# Patient Record
Sex: FEMALE | Race: WHITE | NOT HISPANIC OR LATINO | Employment: FULL TIME | URBAN - METROPOLITAN AREA
[De-identification: names, ages, dates, MRNs, and addresses within clinical notes are randomized per-mention and may not be internally consistent; named-entity substitution may affect disease eponyms.]

---

## 2017-07-03 ENCOUNTER — DOCUMENTATION (OUTPATIENT)
Dept: BARIATRICS/WEIGHT MGMT | Facility: CLINIC | Age: 57
End: 2017-07-03

## 2017-07-03 DIAGNOSIS — R10.13 DYSPEPSIA: ICD-10-CM

## 2017-07-03 DIAGNOSIS — R06.00 DYSPNEA, UNSPECIFIED TYPE: ICD-10-CM

## 2017-07-03 DIAGNOSIS — R53.83 FATIGUE, UNSPECIFIED TYPE: ICD-10-CM

## 2017-07-03 DIAGNOSIS — R73.03 PREDIABETES: Primary | ICD-10-CM

## 2017-07-03 RX ORDER — BUPROPION HYDROCHLORIDE 150 MG/1
150 TABLET ORAL DAILY
COMMUNITY
End: 2017-12-08

## 2017-07-03 RX ORDER — HYDROCHLOROTHIAZIDE 25 MG/1
25 TABLET ORAL DAILY
COMMUNITY
End: 2017-12-13 | Stop reason: HOSPADM

## 2017-07-03 RX ORDER — CHOLECALCIFEROL (VITAMIN D3) 125 MCG
5 CAPSULE ORAL NIGHTLY
COMMUNITY
End: 2017-12-08

## 2017-07-03 RX ORDER — ANASTROZOLE 1 MG/1
TABLET ORAL NIGHTLY
COMMUNITY

## 2017-07-03 RX ORDER — MULTIVIT WITH MINERALS/LUTEIN
1000 TABLET ORAL DAILY
COMMUNITY
End: 2017-12-08

## 2017-07-03 RX ORDER — BIOTIN 10000 MCG
10 CAPSULE ORAL DAILY
COMMUNITY

## 2017-07-31 DIAGNOSIS — R73.03 PREDIABETES: ICD-10-CM

## 2017-07-31 DIAGNOSIS — R10.13 DYSPEPSIA: ICD-10-CM

## 2017-07-31 DIAGNOSIS — R06.00 DYSPNEA, UNSPECIFIED TYPE: ICD-10-CM

## 2017-07-31 DIAGNOSIS — R53.83 FATIGUE, UNSPECIFIED TYPE: ICD-10-CM

## 2017-08-03 ENCOUNTER — OFFICE VISIT (OUTPATIENT)
Dept: BARIATRICS/WEIGHT MGMT | Facility: CLINIC | Age: 57
End: 2017-08-03

## 2017-08-03 ENCOUNTER — DOCUMENTATION (OUTPATIENT)
Dept: BARIATRICS/WEIGHT MGMT | Facility: CLINIC | Age: 57
End: 2017-08-03

## 2017-08-03 VITALS
OXYGEN SATURATION: 98 % | HEIGHT: 64 IN | DIASTOLIC BLOOD PRESSURE: 82 MMHG | HEART RATE: 59 BPM | RESPIRATION RATE: 20 BRPM | BODY MASS INDEX: 37.73 KG/M2 | SYSTOLIC BLOOD PRESSURE: 142 MMHG | TEMPERATURE: 98 F | WEIGHT: 221 LBS

## 2017-08-03 DIAGNOSIS — I10 ESSENTIAL HYPERTENSION: Primary | ICD-10-CM

## 2017-08-03 DIAGNOSIS — R12 HEARTBURN: ICD-10-CM

## 2017-08-03 DIAGNOSIS — R53.83 FATIGUE, UNSPECIFIED TYPE: ICD-10-CM

## 2017-08-03 DIAGNOSIS — E66.9 OBESITY, CLASS II, BMI 35-39.9: ICD-10-CM

## 2017-08-03 PROCEDURE — 99205 OFFICE O/P NEW HI 60 MIN: CPT | Performed by: PHYSICIAN ASSISTANT

## 2017-08-03 RX ORDER — RANITIDINE 150 MG/1
150 TABLET ORAL AS NEEDED
COMMUNITY
End: 2017-12-13 | Stop reason: HOSPADM

## 2017-08-03 NOTE — PROGRESS NOTES
[facility] GROUP BARIATRIC SURGERY  [address]  [facility] [address]  [phone]      Patient  Name:  Sonya Peterson.  :  1960      Date of Visit: 8/3/2017      Chief Complaint:  weight gain; unable to maintain weight loss    History of Present Illness:  Sonya Peterson is a 57 y.o. female who presents today for evaluation, education and consultation regarding weight loss surgery. The patient is interested in sleeve gastrectomy.     Sonya has been overweight for at least 15 years, has been 35 pounds or more overweight for at least 8 years, has been 100 pounds or more overweight for n/a or more years and started dieting at age 22.      Previous diet attempts include: [employer] and [employer]; [employer] and Weight Watchers; Wellbutrin and Dexatrim.  The most weight Sonya lost was 25 pounds on weight watchers but was only able to maintain that weight loss for less than 6 months.  Her maximum lifetime weight is 221 pounds.      Past Medical History:   Diagnosis Date   • Breast cancer     dx 2015, stage 2, s/p lumpectomy, then chemo/XRT, finished 2016 - follows @ [facility], mammograms q 6months, last 2017 OK   • Depression    • Dyspepsia    • Fatigue    • Heartburn     BID Zantac   • HTN (hypertension)    • Obesity      Past Surgical History:   Procedure Laterality Date   • BREAST LUMPECTOMY Left     for stage 2 breast CA   •  SECTION     • COLONOSCOPY      unremarkable   • TONSILLECTOMY     • TOTAL ABDOMINAL HYSTERECTOMY      endometriosis   • TUBAL ABDOMINAL LIGATION     • TYMPANOSTOMY TUBE PLACEMENT         Allergies   Allergen Reactions   • Ace Inhibitors Swelling   • Sulfa Antibiotics      rigors/chills         Current Outpatient Prescriptions:   •  anastrozole (ARIMIDEX) 1 MG tablet, Take  by mouth Daily., Disp: , Rfl:   •  Biotin 10 MG capsule, Take 10 mg by mouth Daily., Disp: , Rfl:   •  buPROPion XL (WELLBUTRIN  XL) 150 MG 24 hr tablet, Take 150 mg by mouth Daily., Disp: , Rfl:   •  fluticasone (VERAMYST) 27.5 MCG/SPRAY nasal spray, 2 sprays into each nostril Daily., Disp: , Rfl:   •  hydrochlorothiazide (HYDRODIURIL) 25 MG tablet, Take 25 mg by mouth Daily., Disp: , Rfl:   •  melatonin 5 MG tablet tablet, Take 5 mg by mouth Every Night., Disp: , Rfl:   •  metoprolol tartrate (LOPRESSOR) 25 MG tablet, Take 25 mg by mouth 2 (Two) Times a Day., Disp: , Rfl:   •  Multiple Vitamins-Minerals (MULTIVITAMIN ADULT PO), Take  by mouth Daily., Disp: , Rfl:   •  raNITIdine (ZANTAC) 150 MG tablet, Take 150 mg by mouth 2 (Two) Times a Day., Disp: , Rfl:   •  vitamin E 1000 UNIT capsule, Take 1,000 Units by mouth Daily., Disp: , Rfl:     Social History     Social History   • Marital status:      Spouse name: Tavo Davis   • Number of children: N/A   • Years of education: Masters degree     Occupational History   • APRN [employer]     Social History Main Topics   • Smoking status: Never Smoker   • Smokeless tobacco: Never Used   • Alcohol use Yes      Comment: Less than 1 per mo. 1 drink each time for 30 yrs   • Drug use: No   • Sexual activity: Not on file     Other Topics Concern   • Not on file     Social History Narrative    Lives in [place], [place] w/ .   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[employer]     Family History   Problem Relation Age of Onset   • Obesity Mother    • Diabetes Father    • Hypertension Father    • Heart disease Father    • Hypertension Brother    • Hypertension Maternal Grandmother    • Stroke Maternal Grandmother    • Heart disease Maternal Grandmother    • COPD Maternal Grandmother    • Hypertension Maternal Grandfather    • Stroke Maternal Grandfather    • Heart disease Maternal Grandfather    • Obesity Paternal Grandmother    • Cerebral aneurysm Paternal Grandfather          Review of Systems:  Constitutional:  The patient reports fatigue, weight gain and denies fevers  and chills.  Cardiovascular:  The patient reports HTN and denies heart disease.  Respiratory:  The patient denies asthma, apnea and PE.  Gastrointestinal:  The patient reports heartburn and denies liver disease or gallbladder issues.  Genitourinary:  The patient denies renal insufficiency.    Musculoskeletal:  The patient denies fibromyalgia and autoimmune disease.  Neurological:  The patient denies seizure and stroke.  Psychiatric:  The patient reports depression and denies anxiety.  Endocrine:  The patient denies diabetes and thyroid disease.  Hematologic:  The patient denies anemia and bleeding disorder.  Skin:  The patient denies MRSA.    Physical Exam:  Vital Signs:  Weight: 221 lb (100 kg)   Body mass index is 37.93 kg/(m^2).  Temp: 98 °F (36.7 °C)   Heart Rate: 59   BP: 142/82     Physical Exam   Constitutional: She is oriented to person, place, and time. She appears well-developed and well-nourished.   HENT:   Head: Normocephalic and atraumatic.   Eyes: Conjunctivae are normal. No scleral icterus.   Neck: Neck supple. No thyromegaly present.   Cardiovascular: Normal rate and regular rhythm.    No murmur heard.  Pulmonary/Chest: Effort normal and breath sounds normal. No respiratory distress. She has no wheezes. She has no rales.   Abdominal: Soft. Bowel sounds are normal. She exhibits no distension and no mass. There is no tenderness. No hernia.   Musculoskeletal: Normal range of motion. She exhibits no edema.   Neurological: She is alert and oriented to person, place, and time. Gait normal.   Skin: Skin is warm and dry. No rash noted.   Psychiatric: She has a normal mood and affect. Judgment normal.   Vitals reviewed.         Patient Active Problem List   Diagnosis   • HTN (hypertension)   • Depression   • Obesity   • Fatigue   • Heartburn   • Dyspepsia   • Breast cancer       Assessment:    Sonya Peterson is a 57 y.o. year old female with medically complicated obesity pursuing sleeve  gastrectomy.    Weight loss surgery is deemed medically necessary given the following obesity related comorbidities including hypertension with current Weight: 221 lb (100 kg) and Body mass index is 37.93 kg/(m^2)..    Plan:  The consultation plan and program requirements were reviewed with the patient.  The patient has been advised that a letter of medical support must be obtained from her primary care physician or referring provider. A psychological evaluation will be arranged.  A nutritional evaluation will be performed.  The patient was advised to start a high protein and low carbohydrate diet.  Necessary lifestyle modifications were discussed.  Instructions on how to access ROSARIO was given to the patient.  ROSARIO is an internet based educational video that explains the surgical procedure chosen and answers basic questions regarding that procedure.     Preoperative testing will include: CBC, CMP, Fasting Lipids, TSH, HgA1C, H.Pylori, CXR, EKG and EGD     Additional preop clearances required prior to surgery: Cardiac.      Patient understands that bariatric surgery is not cosmetic surgery but rather a tool to help make a lifelong commitment to lifestyle changes including diet, exercise, behavior modifications, and healthy habits.      CATRINA Bruno

## 2017-08-03 NOTE — PROGRESS NOTES
"Weight Loss Surgery  Presurgical Nutrition Assessment     Sonya NGUYEN Ofzechariah  2017  [id number]  [id number]  1960  female    Surgery desired: Sleeve Gastrectomy    Ht 64\" (162.6 cm); Wt 221 # (100 kg); BMI 37.9   Past Medical History:   Diagnosis Date   • Breast cancer     dx 2015, stage 2, s/p lumpectomy, then chemo/XRT, finished 2016 - follows @ [facility], mammograms q 6months, last 2017 OK   • Depression    • Dyspepsia    • Fatigue    • Heartburn     BID Zantac   • HTN (hypertension)    • Obesity      Past Surgical History:   Procedure Laterality Date   • BREAST LUMPECTOMY Left     for stage 2 breast CA   •  SECTION     • COLONOSCOPY      unremarkable   • TONSILLECTOMY     • TOTAL ABDOMINAL HYSTERECTOMY      endometriosis   • TUBAL ABDOMINAL LIGATION     • TYMPANOSTOMY TUBE PLACEMENT       Allergies   Allergen Reactions   • Ace Inhibitors Swelling   • Sulfa Antibiotics      rigors/chills       Current Outpatient Prescriptions:   •  anastrozole (ARIMIDEX) 1 MG tablet, Take  by mouth Daily., Disp: , Rfl:   •  Biotin 10 MG capsule, Take 10 mg by mouth Daily., Disp: , Rfl:   •  buPROPion XL (WELLBUTRIN XL) 150 MG 24 hr tablet, Take 150 mg by mouth Daily., Disp: , Rfl:   •  fluticasone (VERAMYST) 27.5 MCG/SPRAY nasal spray, 2 sprays into each nostril Daily., Disp: , Rfl:   •  hydrochlorothiazide (HYDRODIURIL) 25 MG tablet, Take 25 mg by mouth Daily., Disp: , Rfl:   •  melatonin 5 MG tablet tablet, Take 5 mg by mouth Every Night., Disp: , Rfl:   •  metoprolol tartrate (LOPRESSOR) 25 MG tablet, Take 25 mg by mouth 2 (Two) Times a Day., Disp: , Rfl:   •  Multiple Vitamins-Minerals (MULTIVITAMIN ADULT PO), Take  by mouth Daily., Disp: , Rfl:   •  raNITIdine (ZANTAC) 150 MG tablet, Take 150 mg by mouth 2 (Two) Times a Day., Disp: , Rfl:   •  vitamin E 1000 UNIT capsule, Take 1,000 Units by mouth Daily., Disp: , Rfl:       Nutrition Assessment    Estimated energy needs:  1580 " "kcal    Estimated calories for weight loss:  1100 kcal    IBW (Pounds):  145 #        Excess body weight (Pounds):  76 #       Nutrition Recall  24 Hour recall: (B) (L) (D) -  Reviewed and discussed with patient.  Judy watson nutrition & meal planning.  Eats balanced meals but side dishes, condiments, and snacks are not consistent /c wt loss goals.  High carb choices often eg egg, bean & potato breakfast burrito, mashed potatoes, potato chips. States she drinks at most one-20 oz serving diet Pepsi qd.  Drinks lots of water and unsweetened iced tea.  Few vegetables (c/o of constipation & use of products eg fiber \"pills\")        Exercise  never      Education    Provided info packet re.   Sleeve Gastrectomy; .  Reviewed mechanics of healthy eating for current use ongoing /p recovery from surgery. Enc'd pt to choose a preferred & tolerated protein powder or pkg'd protein beverage for post-op diet, and for current use if needed to ingest adequate protein.  Discussed goal setting to address changes from unhealthy eating habits to healthier ones.     Recommend that team proceed with surgery and follow per protocol.      Nutrition Goals   Dietary Guidelines per information packet, as described above.  Protein goal:  grams per day   -140 grams per day   Eliminate soda    Exercise Goals  Continue current exercise routine   Add 15-30 minutes of activity per day as tolerated      Aide Leal RD  08/03/2017  3:30 PM  "

## 2017-08-28 LAB
H PYLORI IGA SER-ACNC: <9 UNITS (ref 0–8.9)
H PYLORI IGG SER IA-ACNC: <0.9 U/ML (ref 0–0.8)
H PYLORI IGM SER-ACNC: <9 UNITS (ref 0–8.9)

## 2017-10-26 DIAGNOSIS — R53.83 FATIGUE, UNSPECIFIED TYPE: ICD-10-CM

## 2017-10-26 DIAGNOSIS — R06.00 DYSPNEA, UNSPECIFIED TYPE: Primary | ICD-10-CM

## 2017-10-26 DIAGNOSIS — R06.00 DYSPNEA, UNSPECIFIED TYPE: ICD-10-CM

## 2017-11-10 DIAGNOSIS — R53.83 FATIGUE, UNSPECIFIED TYPE: ICD-10-CM

## 2017-11-10 DIAGNOSIS — R06.00 DYSPNEA, UNSPECIFIED TYPE: Primary | ICD-10-CM

## 2017-11-13 ENCOUNTER — CONSULT (OUTPATIENT)
Dept: BARIATRICS/WEIGHT MGMT | Facility: CLINIC | Age: 57
End: 2017-11-13

## 2017-11-13 VITALS
TEMPERATURE: 98 F | WEIGHT: 234.5 LBS | OXYGEN SATURATION: 99 % | DIASTOLIC BLOOD PRESSURE: 87 MMHG | SYSTOLIC BLOOD PRESSURE: 143 MMHG | HEART RATE: 66 BPM | HEIGHT: 64 IN | BODY MASS INDEX: 40.04 KG/M2 | RESPIRATION RATE: 18 BRPM

## 2017-11-13 DIAGNOSIS — E66.9 OBESITY, CLASS II, BMI 35-39.9: ICD-10-CM

## 2017-11-13 DIAGNOSIS — R10.13 DYSPEPSIA: ICD-10-CM

## 2017-11-13 DIAGNOSIS — R06.00 DYSPNEA, UNSPECIFIED TYPE: ICD-10-CM

## 2017-11-13 DIAGNOSIS — R53.83 FATIGUE, UNSPECIFIED TYPE: Primary | ICD-10-CM

## 2017-11-13 DIAGNOSIS — R12 HEARTBURN: ICD-10-CM

## 2017-11-13 DIAGNOSIS — R73.03 PREDIABETES: ICD-10-CM

## 2017-11-13 DIAGNOSIS — I10 ESSENTIAL HYPERTENSION: ICD-10-CM

## 2017-11-13 PROCEDURE — 99214 OFFICE O/P EST MOD 30 MIN: CPT | Performed by: SURGERY

## 2017-11-13 RX ORDER — SCOLOPAMINE TRANSDERMAL SYSTEM 1 MG/1
1 PATCH, EXTENDED RELEASE TRANSDERMAL
Status: CANCELLED | OUTPATIENT
Start: 2017-11-13

## 2017-11-13 RX ORDER — SODIUM CHLORIDE 9 MG/ML
150 INJECTION, SOLUTION INTRAVENOUS CONTINUOUS
Status: CANCELLED | OUTPATIENT
Start: 2017-11-13

## 2017-11-13 RX ORDER — PANTOPRAZOLE SODIUM 40 MG/10ML
40 INJECTION, POWDER, LYOPHILIZED, FOR SOLUTION INTRAVENOUS ONCE
Status: CANCELLED | OUTPATIENT
Start: 2017-11-13 | End: 2017-11-13

## 2017-11-13 NOTE — PROGRESS NOTES
"[facility] BARIATRIC SURGERY  [address]  [facility] [address] [phone]      Patient  Name:  Sonya Peterson  :  1960      Date of Visit: 2017      Chief Complaint:  weight gain; unable to maintain weight loss    History of Present Illness:  Sonya Peterson is a 57 y.o. female who presents today for evaluation, education and consultation regarding weight loss surgery.     Patient has been overweight for many years, with numerous failed dietary/weight loss attempts.  She now has obesity related comorbidities of hypertension, depression, fatigue, heartburn and as such has decided to pursue weight loss surgery.    There has been no change in the medical history.  She takes Arimidex as hormonal therapy for breast cancer, which is in remission.  Her oncologist has provided clearance, but no discussion has been made about whether or not to hold Arimidex before and after surgery.      Review of data:    EGD: 17 @ , +HH, \"chemical gastritis\", no esophageal bx  CBC: ok  CMP: ok  HP serum negative  Oncology clearance re: off Arimidex: \"In clinical remission.\"  No mention of Arimidex.     Cardiac clearance: moderate and acceptable risk  Echo: nl LV, no valve lesions  Stress test: normal  Pulm clearance: cleared  EKG: NSR  CXR: ok  RICK: Negative        Last tobacco: n/a  Last NSAIDs: 2 weeks ago  Last ASA: n/a  Last steroids: n/a  Last hormones: Arimidex (aromatose inhibitor)      Past Medical History:   Diagnosis Date   • Breast cancer     dx 2015, stage 2, s/p lumpectomy, then chemo/XRT, finished 2016 - follows @ [facility], mammograms q 6months, last 2017 OK   • Depression    • Dyspepsia    • Fatigue    • Heartburn     BID Zantac   • HTN (hypertension)    • Obesity      Past Surgical History:   Procedure Laterality Date   • BREAST LUMPECTOMY Left     for stage 2 breast CA   •  SECTION     • COLONOSCOPY      unremarkable   • TONSILLECTOMY  " 1974   • TOTAL ABDOMINAL HYSTERECTOMY  2004    endometriosis   • TUBAL ABDOMINAL LIGATION  1999   • TYMPANOSTOMY TUBE PLACEMENT  1974       Allergies   Allergen Reactions   • Ace Inhibitors Swelling   • Sulfa Antibiotics      rigors/chills       Current Outpatient Prescriptions:   •  anastrozole (ARIMIDEX) 1 MG tablet, Take  by mouth Daily., Disp: , Rfl:   •  Biotin 10 MG capsule, Take 10 mg by mouth Daily., Disp: , Rfl:   •  buPROPion XL (WELLBUTRIN XL) 150 MG 24 hr tablet, Take 150 mg by mouth Daily., Disp: , Rfl:   •  hydrochlorothiazide (HYDRODIURIL) 25 MG tablet, Take 25 mg by mouth Daily., Disp: , Rfl:   •  melatonin 5 MG tablet tablet, Take 5 mg by mouth Every Night., Disp: , Rfl:   •  metoprolol tartrate (LOPRESSOR) 25 MG tablet, Take 25 mg by mouth 2 (Two) Times a Day., Disp: , Rfl:   •  Multiple Vitamins-Minerals (MULTIVITAMIN ADULT PO), Take  by mouth Daily., Disp: , Rfl:   •  raNITIdine (ZANTAC) 150 MG tablet, Take 150 mg by mouth Every Night., Disp: , Rfl:   •  vitamin E 1000 UNIT capsule, Take 1,000 Units by mouth Daily., Disp: , Rfl:     Social History     Social History   • Marital status:      Spouse name: Tavo Davis   • Number of children: N/A   • Years of education: Masters degree     Occupational History   • [employer]     Social History Main Topics   • Smoking status: Never Smoker   • Smokeless tobacco: Never Used   • Alcohol use Yes      Comment: Less than 1 per mo. 1 drink each time for 30 yrs   • Drug use: No   • Sexual activity: Not on file     Other Topics Concern   • Not on file     Social History Narrative    Lives in [place], [place] w/ .   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[employer]     Family History   Problem Relation Age of Onset   • Obesity Mother    • Diabetes Father    • Hypertension Father    • Heart disease Father    • Hypertension Brother    • Hypertension Maternal Grandmother    • Stroke Maternal Grandmother    • Heart disease  Maternal Grandmother    • COPD Maternal Grandmother    • Hypertension Maternal Grandfather    • Stroke Maternal Grandfather    • Heart disease Maternal Grandfather    • Obesity Paternal Grandmother    • Cerebral aneurysm Paternal Grandfather        Review of Systems:  Constitutional:  The patient reports fatigue, weight gain and denies fevers and chills.  Cardiovascular:  The patient reports HTN and denies heart disease.  Respiratory:  The patient reports none and denies asthma, apnea and PE.  Gastrointestinal:  The patient reports heartburn and denies pancreatitis, liver disease and IBS.  Genitourinary:  The patient denies renal insufficiency.    Musculoskeletal:  The patient reports none and denies fibromyalgia and autoimmune disease.  Neurological:  The patient reports none and denies seizure and stroke.  Psychiatric:  The patient reports depression and denies anxiety and bipolar disorder.  Endocrine:  The patient reports none and denies diabetes, thyroid disease and gout.  Hematologic:  The patient reports none and denies anemia and bleeding disorder.  Skin:  The patient denies MRSA.    Physical Exam:  Vital Signs:  Weight: 234 lb 8 oz (106 kg)   Body mass index is 40.25 kg/(m^2).  Temp: 98 °F (36.7 °C)   Heart Rate: 66   BP: 143/87     Physical Exam   Constitutional: She is oriented to person, place, and time. She appears well-developed and well-nourished. No distress.   HENT:   Head: Normocephalic and atraumatic.   Mouth/Throat: No oropharyngeal exudate.   Eyes: Conjunctivae and EOM are normal. Pupils are equal, round, and reactive to light. No scleral icterus.   Neck: Normal range of motion. Neck supple. No thyromegaly present.   Cardiovascular: Normal rate, regular rhythm and normal heart sounds.    Pulmonary/Chest: Effort normal and breath sounds normal. No respiratory distress.   Abdominal: Soft. She exhibits no distension. There is no tenderness.       Musculoskeletal: Normal range of motion. She exhibits  no edema or deformity.   Neurological: She is alert and oriented to person, place, and time. No cranial nerve deficit.   Skin: Skin is warm and dry. No rash noted. No erythema.   Psychiatric: She has a normal mood and affect. Her behavior is normal. Judgment and thought content normal.       Patient Active Problem List   Diagnosis   • HTN (hypertension)   • Depression   • Obesity   • Fatigue   • Heartburn   • Dyspepsia   • Breast cancer       Assessment:    Sonya Peterson is a 57 y.o. year old female with medically complicated obesity.    Weight loss surgery is deemed medically necessary given the following obesity related comorbidities including hypertension, depression, fatigue, heartburn with current Weight: 234 lb 8 oz (106 kg) and Body mass index is 40.25 kg/(m^2)..    Patient is aware that surgery is a tool, and that weight loss is not guaranteed but only seen in the context of appropriate use, follow up and exercise.    The patient was present for an approximately a 2.5 hour discussion of the purpose of weight loss surgery, how WLS is a tool to assist in achieving weight loss goals, the most common complications and how best to avoid them, and the strategies for short and long term weight loss.  Ample opportunity to discuss questions was available both in group and during the time of individual examination.    I reviewed all available preop labs, Xrays, tests, clearances, etc and signed off on these in the record.  All of this in addition to the patient's unique history and exam has been taken into consideration in determining their appropriate candidacy for weight loss surgery.    Complications  of laparoscopic/possible robotic gastric sleeve were discussed. The patient is well aware of the potential complications of surgery that include but not limited to bleeding, infections, deep venous thrombosis, pulmonary embolism, pulmonary complications such as pneumonia, cardiac events, hernias, small bowel  "obstruction, damage to the spleen or other organs, bowel injury, disfiguring scars, failure to lose weight, need for additional surgery, conversion to an open procedure, and death. Patient is also aware of complications which apply in this particular procedure that can include but are not limited to a \"leak\" at the staple line which in some instances may require conversion to gastric bypass.    The patient is aware if a hiatal hernia is encountered, it likely will be repaired.  R/B/A Rx to hiatal hernia repair were discussed as outlined in our long consent form.  Briefly risks in addition to those for LSG include recurrent hernia, WINDY, dysphagia, esophageal injury, pneumothorax, injury to the vagus nerves, injury to the thoracic duct, aorta or vena cava.    Greater than 3 minutes was spent with the patient discussing avoiding all tobacco products and second hand smoke at least 2 weeks pre-operatively and 6 weeks post-operatively to minimize the risk of sleeve leak.  This included discussing the importance of avoiding even secondhand smoke as the risk of leak is increased.  Examples discussed:  I made it very clear that the patient understands they should avoid even riding in a car where someone has previously smoked in the last 2 weeks, living in a house where someone smokes (even if it's in a separate room/patio/attached garage, etc.) we discussed that they should not have a conversation with a group of people who are smoking even if it's outside.  They can be around wood burning fires and barbecue.  I told them I do not know if marijuana has a same effects but my overall recommendation is to avoid it for 2 weeks prior in 6 weeks after surgery.  They also are aware that nicotine may also increase the risk of leak and I strongly encouraged him to avoid that as well for 2 weeks prior in 6 weeks after surgery.    Discussed the risks, benefits and alternative therapies at great length as outlined in our extensive " consent forms, consent videos, and educational teaching process under the direction of the center's .    A copy of the patient's signed informed consent is on file.    Plan:  Laparoscopic sleeve gastrectomy and hiatal hernia repair.  I have called her oncologist (Dr. Christine Hartmann [phone]) to discuss the risk:benefit ratio. R/b/a of holding vs. Continuing Arimidex explained to patient, and possible increased risk of VTE.  The patient will continue Arimidex, and will rx 3 weeks of Eliquis to cover post-operative VTE risk on aromatase inhibitor.        Alexandra Elmore MD

## 2017-11-14 PROBLEM — E66.9 OBESITY, CLASS II, BMI 35-39.9: Status: ACTIVE | Noted: 2017-11-14

## 2017-12-08 ENCOUNTER — APPOINTMENT (OUTPATIENT)
Dept: PREADMISSION TESTING | Facility: HOSPITAL | Age: 57
End: 2017-12-08

## 2017-12-08 LAB
DEPRECATED RDW RBC AUTO: 42.7 FL (ref 37–54)
ERYTHROCYTE [DISTWIDTH] IN BLOOD BY AUTOMATED COUNT: 12.7 % (ref 11.3–14.5)
HBA1C MFR BLD: 5.8 % (ref 4.8–5.6)
HCT VFR BLD AUTO: 44.1 % (ref 34.5–44)
HGB BLD-MCNC: 15.3 G/DL (ref 11.5–15.5)
MCH RBC QN AUTO: 31.7 PG (ref 27–31)
MCHC RBC AUTO-ENTMCNC: 34.7 G/DL (ref 32–36)
MCV RBC AUTO: 91.3 FL (ref 80–99)
PLATELET # BLD AUTO: 323 10*3/MM3 (ref 150–450)
PMV BLD AUTO: 9.5 FL (ref 6–12)
POTASSIUM BLD-SCNC: 3.4 MMOL/L (ref 3.5–5.5)
RBC # BLD AUTO: 4.83 10*6/MM3 (ref 3.89–5.14)
WBC NRBC COR # BLD: 6.41 10*3/MM3 (ref 3.5–10.8)

## 2017-12-08 PROCEDURE — 85027 COMPLETE CBC AUTOMATED: CPT | Performed by: ANESTHESIOLOGY

## 2017-12-08 PROCEDURE — 83036 HEMOGLOBIN GLYCOSYLATED A1C: CPT | Performed by: ANESTHESIOLOGY

## 2017-12-08 PROCEDURE — 84132 ASSAY OF SERUM POTASSIUM: CPT | Performed by: ANESTHESIOLOGY

## 2017-12-08 PROCEDURE — 36415 COLL VENOUS BLD VENIPUNCTURE: CPT

## 2017-12-08 RX ORDER — VITAMIN E 268 MG
400 CAPSULE ORAL DAILY
COMMUNITY

## 2017-12-08 RX ORDER — BUPROPION HYDROCHLORIDE 150 MG/1
150 TABLET, EXTENDED RELEASE ORAL 2 TIMES DAILY
COMMUNITY

## 2017-12-08 RX ORDER — CETIRIZINE HYDROCHLORIDE 10 MG/1
10 TABLET ORAL DAILY
COMMUNITY

## 2017-12-08 RX ORDER — PHENOL 1.4 %
1 AEROSOL, SPRAY (ML) MUCOUS MEMBRANE
COMMUNITY

## 2017-12-12 ENCOUNTER — HOSPITAL ENCOUNTER (INPATIENT)
Facility: HOSPITAL | Age: 57
LOS: 1 days | Discharge: HOME | End: 2017-12-13
Attending: SURGERY | Admitting: SURGERY

## 2017-12-12 ENCOUNTER — ANESTHESIA EVENT (OUTPATIENT)
Dept: PERIOP | Facility: HOSPITAL | Age: 57
End: 2017-12-12

## 2017-12-12 ENCOUNTER — ANESTHESIA (OUTPATIENT)
Dept: PERIOP | Facility: HOSPITAL | Age: 57
End: 2017-12-12

## 2017-12-12 DIAGNOSIS — E66.9 OBESITY, CLASS II, BMI 35-39.9: ICD-10-CM

## 2017-12-12 LAB — POTASSIUM BLDA-SCNC: 3.47 MMOL/L (ref 3.5–5.3)

## 2017-12-12 PROCEDURE — 43775 LAP SLEEVE GASTRECTOMY: CPT | Performed by: SURGERY

## 2017-12-12 PROCEDURE — 25010000002 PROPOFOL 10 MG/ML EMULSION: Performed by: NURSE ANESTHETIST, CERTIFIED REGISTERED

## 2017-12-12 PROCEDURE — 25010000003 CEFAZOLIN IN DEXTROSE 2-4 GM/100ML-% SOLUTION: Performed by: SURGERY

## 2017-12-12 PROCEDURE — 0BQT4ZZ REPAIR DIAPHRAGM, PERCUTANEOUS ENDOSCOPIC APPROACH: ICD-10-PCS | Performed by: SURGERY

## 2017-12-12 PROCEDURE — 25010000002 DEXAMETHASONE PER 1 MG: Performed by: NURSE ANESTHETIST, CERTIFIED REGISTERED

## 2017-12-12 PROCEDURE — 0DB64Z3 EXCISION OF STOMACH, PERCUTANEOUS ENDOSCOPIC APPROACH, VERTICAL: ICD-10-PCS | Performed by: SURGERY

## 2017-12-12 PROCEDURE — 94799 UNLISTED PULMONARY SVC/PX: CPT

## 2017-12-12 PROCEDURE — 25010000002 ENOXAPARIN PER 10 MG: Performed by: SURGERY

## 2017-12-12 PROCEDURE — 25010000002 HYDROMORPHONE PER 4 MG: Performed by: SURGERY

## 2017-12-12 PROCEDURE — 84132 ASSAY OF SERUM POTASSIUM: CPT | Performed by: ANESTHESIOLOGY

## 2017-12-12 PROCEDURE — 25010000002 DEXAMETHASONE SODIUM PHOSPHATE 10 MG/ML SOLUTION 1 ML VIAL: Performed by: NURSE ANESTHETIST, CERTIFIED REGISTERED

## 2017-12-12 PROCEDURE — 25010000002 BUPRENORPHINE PER 0.1 MG: Performed by: NURSE ANESTHETIST, CERTIFIED REGISTERED

## 2017-12-12 PROCEDURE — 25010000003 CEFAZOLIN PER 500 MG: Performed by: SURGERY

## 2017-12-12 PROCEDURE — 88307 TISSUE EXAM BY PATHOLOGIST: CPT | Performed by: SURGERY

## 2017-12-12 PROCEDURE — 25010000002 HYDRALAZINE PER 20 MG: Performed by: ANESTHESIOLOGY

## 2017-12-12 PROCEDURE — 0DJ08ZZ INSPECTION OF UPPER INTESTINAL TRACT, VIA NATURAL OR ARTIFICIAL OPENING ENDOSCOPIC: ICD-10-PCS | Performed by: SURGERY

## 2017-12-12 DEVICE — PERI-STRIPS DRY WITH VERITAS COLLAGEN MATRIX (PSD-V) IS PREPARED FROM DEHYDRATED BOVINE PERICARDIUM PROCURED FROM CATTLE UNDER 30 MONTHS OF AGE IN THE UNITED STATES. ONE (1) TUBE OF PSD GEL (GEL) IS PROVIDED FOR EVERY TWO (2) POUCHES OF PSD-V. THE GEL IS USED TO CREATE A TEMPORARY BOND BETWEEN THE PSD-V BUTTRESS AND THE SURGICAL STAPLER JAWS UNTIL THE STAPLER IS POSITIONED AND FIRED.
Type: IMPLANTABLE DEVICE | Site: STOMACH | Status: FUNCTIONAL
Brand: PERI-STRIPS DRY WITH VERITAS COLLAGEN MATRIX

## 2017-12-12 DEVICE — SEALANT FIBRIN TISSEEL FZ 4ML: Type: IMPLANTABLE DEVICE | Site: STOMACH | Status: FUNCTIONAL

## 2017-12-12 RX ORDER — LORAZEPAM 2 MG/ML
0.5 INJECTION INTRAMUSCULAR EVERY 12 HOURS PRN
Status: DISCONTINUED | OUTPATIENT
Start: 2017-12-12 | End: 2017-12-13 | Stop reason: HOSPADM

## 2017-12-12 RX ORDER — SODIUM CHLORIDE 9 MG/ML
150 INJECTION, SOLUTION INTRAVENOUS CONTINUOUS
Status: DISCONTINUED | OUTPATIENT
Start: 2017-12-12 | End: 2017-12-12 | Stop reason: HOSPADM

## 2017-12-12 RX ORDER — DIPHENHYDRAMINE HYDROCHLORIDE 50 MG/ML
25 INJECTION INTRAMUSCULAR; INTRAVENOUS EVERY 4 HOURS PRN
Status: DISCONTINUED | OUTPATIENT
Start: 2017-12-12 | End: 2017-12-13 | Stop reason: HOSPADM

## 2017-12-12 RX ORDER — ANASTROZOLE 1 MG/1
1 TABLET ORAL NIGHTLY
Status: DISCONTINUED | OUTPATIENT
Start: 2017-12-12 | End: 2017-12-13 | Stop reason: HOSPADM

## 2017-12-12 RX ORDER — HYDROMORPHONE HYDROCHLORIDE 2 MG/1
2 TABLET ORAL EVERY 4 HOURS PRN
Status: DISCONTINUED | OUTPATIENT
Start: 2017-12-12 | End: 2017-12-13 | Stop reason: HOSPADM

## 2017-12-12 RX ORDER — SODIUM CHLORIDE AND POTASSIUM CHLORIDE 150; 450 MG/100ML; MG/100ML
125 INJECTION, SOLUTION INTRAVENOUS CONTINUOUS
Status: DISCONTINUED | OUTPATIENT
Start: 2017-12-13 | End: 2017-12-13 | Stop reason: HOSPADM

## 2017-12-12 RX ORDER — SIMETHICONE 80 MG
80 TABLET,CHEWABLE ORAL 4 TIMES DAILY PRN
Status: DISCONTINUED | OUTPATIENT
Start: 2017-12-12 | End: 2017-12-13 | Stop reason: HOSPADM

## 2017-12-12 RX ORDER — PROPOFOL 10 MG/ML
VIAL (ML) INTRAVENOUS CONTINUOUS PRN
Status: DISCONTINUED | OUTPATIENT
Start: 2017-12-12 | End: 2017-12-12 | Stop reason: SURG

## 2017-12-12 RX ORDER — DEXAMETHASONE SODIUM PHOSPHATE 4 MG/ML
INJECTION, SOLUTION INTRA-ARTICULAR; INTRALESIONAL; INTRAMUSCULAR; INTRAVENOUS; SOFT TISSUE AS NEEDED
Status: DISCONTINUED | OUTPATIENT
Start: 2017-12-12 | End: 2017-12-12 | Stop reason: SURG

## 2017-12-12 RX ORDER — BUPROPION HYDROCHLORIDE 150 MG/1
150 TABLET, EXTENDED RELEASE ORAL EVERY 12 HOURS SCHEDULED
Status: DISCONTINUED | OUTPATIENT
Start: 2017-12-12 | End: 2017-12-13 | Stop reason: HOSPADM

## 2017-12-12 RX ORDER — PANTOPRAZOLE SODIUM 40 MG/10ML
40 INJECTION, POWDER, LYOPHILIZED, FOR SOLUTION INTRAVENOUS
Status: DISCONTINUED | OUTPATIENT
Start: 2017-12-13 | End: 2017-12-13 | Stop reason: HOSPADM

## 2017-12-12 RX ORDER — SODIUM CHLORIDE, SODIUM LACTATE, POTASSIUM CHLORIDE, CALCIUM CHLORIDE 600; 310; 30; 20 MG/100ML; MG/100ML; MG/100ML; MG/100ML
150 INJECTION, SOLUTION INTRAVENOUS CONTINUOUS
Status: DISCONTINUED | OUTPATIENT
Start: 2017-12-12 | End: 2017-12-13 | Stop reason: HOSPADM

## 2017-12-12 RX ORDER — HYDRALAZINE HYDROCHLORIDE 20 MG/ML
5 INJECTION INTRAMUSCULAR; INTRAVENOUS ONCE AS NEEDED
Status: COMPLETED | OUTPATIENT
Start: 2017-12-12 | End: 2017-12-12

## 2017-12-12 RX ORDER — ONDANSETRON 2 MG/ML
4 INJECTION INTRAMUSCULAR; INTRAVENOUS EVERY 6 HOURS PRN
Status: DISCONTINUED | OUTPATIENT
Start: 2017-12-12 | End: 2017-12-13 | Stop reason: HOSPADM

## 2017-12-12 RX ORDER — LIDOCAINE HYDROCHLORIDE 10 MG/ML
0.5 INJECTION, SOLUTION EPIDURAL; INFILTRATION; INTRACAUDAL; PERINEURAL ONCE AS NEEDED
Status: COMPLETED | OUTPATIENT
Start: 2017-12-12 | End: 2017-12-12

## 2017-12-12 RX ORDER — CETIRIZINE HYDROCHLORIDE 10 MG/1
10 TABLET ORAL DAILY
Status: DISCONTINUED | OUTPATIENT
Start: 2017-12-13 | End: 2017-12-13 | Stop reason: HOSPADM

## 2017-12-12 RX ORDER — BUPIVACAINE HYDROCHLORIDE AND EPINEPHRINE 2.5; 5 MG/ML; UG/ML
INJECTION, SOLUTION EPIDURAL; INFILTRATION; INTRACAUDAL; PERINEURAL AS NEEDED
Status: DISCONTINUED | OUTPATIENT
Start: 2017-12-12 | End: 2017-12-12 | Stop reason: HOSPADM

## 2017-12-12 RX ORDER — CYANOCOBALAMIN 1000 UG/ML
1000 INJECTION, SOLUTION INTRAMUSCULAR; SUBCUTANEOUS ONCE
Status: COMPLETED | OUTPATIENT
Start: 2017-12-13 | End: 2017-12-13

## 2017-12-12 RX ORDER — ROCURONIUM BROMIDE 10 MG/ML
INJECTION, SOLUTION INTRAVENOUS AS NEEDED
Status: DISCONTINUED | OUTPATIENT
Start: 2017-12-12 | End: 2017-12-12 | Stop reason: SURG

## 2017-12-12 RX ORDER — PROPOFOL 10 MG/ML
VIAL (ML) INTRAVENOUS AS NEEDED
Status: DISCONTINUED | OUTPATIENT
Start: 2017-12-12 | End: 2017-12-12 | Stop reason: SURG

## 2017-12-12 RX ORDER — CLONIDINE HYDROCHLORIDE 0.1 MG/1
0.1 TABLET ORAL EVERY 6 HOURS PRN
Status: DISCONTINUED | OUTPATIENT
Start: 2017-12-12 | End: 2017-12-13 | Stop reason: HOSPADM

## 2017-12-12 RX ORDER — LIDOCAINE HYDROCHLORIDE 10 MG/ML
INJECTION, SOLUTION INFILTRATION; PERINEURAL AS NEEDED
Status: DISCONTINUED | OUTPATIENT
Start: 2017-12-12 | End: 2017-12-12 | Stop reason: SURG

## 2017-12-12 RX ORDER — SODIUM CHLORIDE, SODIUM LACTATE, POTASSIUM CHLORIDE, CALCIUM CHLORIDE 600; 310; 30; 20 MG/100ML; MG/100ML; MG/100ML; MG/100ML
9 INJECTION, SOLUTION INTRAVENOUS CONTINUOUS
Status: DISCONTINUED | OUTPATIENT
Start: 2017-12-12 | End: 2017-12-12 | Stop reason: HOSPADM

## 2017-12-12 RX ORDER — SCOLOPAMINE TRANSDERMAL SYSTEM 1 MG/1
1 PATCH, EXTENDED RELEASE TRANSDERMAL
Status: DISCONTINUED | OUTPATIENT
Start: 2017-12-12 | End: 2017-12-12 | Stop reason: HOSPADM

## 2017-12-12 RX ORDER — MAGNESIUM HYDROXIDE 1200 MG/15ML
LIQUID ORAL AS NEEDED
Status: DISCONTINUED | OUTPATIENT
Start: 2017-12-12 | End: 2017-12-12 | Stop reason: HOSPADM

## 2017-12-12 RX ORDER — CEFAZOLIN SODIUM 2 G/100ML
2 INJECTION, SOLUTION INTRAVENOUS
Status: COMPLETED | OUTPATIENT
Start: 2017-12-12 | End: 2017-12-12

## 2017-12-12 RX ORDER — NALOXONE HCL 0.4 MG/ML
0.1 VIAL (ML) INJECTION
Status: DISCONTINUED | OUTPATIENT
Start: 2017-12-12 | End: 2017-12-13 | Stop reason: HOSPADM

## 2017-12-12 RX ORDER — METOCLOPRAMIDE HYDROCHLORIDE 5 MG/ML
10 INJECTION INTRAMUSCULAR; INTRAVENOUS EVERY 6 HOURS PRN
Status: DISCONTINUED | OUTPATIENT
Start: 2017-12-12 | End: 2017-12-13 | Stop reason: HOSPADM

## 2017-12-12 RX ORDER — FENTANYL CITRATE 50 UG/ML
50 INJECTION, SOLUTION INTRAMUSCULAR; INTRAVENOUS
Status: DISCONTINUED | OUTPATIENT
Start: 2017-12-12 | End: 2017-12-12 | Stop reason: HOSPADM

## 2017-12-12 RX ORDER — FAMOTIDINE 10 MG/ML
20 INJECTION, SOLUTION INTRAVENOUS ONCE
Status: CANCELLED | OUTPATIENT
Start: 2017-12-12 | End: 2017-12-12

## 2017-12-12 RX ORDER — PROMETHAZINE HYDROCHLORIDE 25 MG/ML
12.5 INJECTION, SOLUTION INTRAMUSCULAR; INTRAVENOUS EVERY 6 HOURS PRN
Status: DISCONTINUED | OUTPATIENT
Start: 2017-12-12 | End: 2017-12-13 | Stop reason: HOSPADM

## 2017-12-12 RX ORDER — LORAZEPAM 1 MG/1
1 TABLET ORAL EVERY 12 HOURS PRN
Status: DISCONTINUED | OUTPATIENT
Start: 2017-12-12 | End: 2017-12-13 | Stop reason: HOSPADM

## 2017-12-12 RX ORDER — FAMOTIDINE 20 MG/1
20 TABLET, FILM COATED ORAL ONCE
Status: CANCELLED | OUTPATIENT
Start: 2017-12-12 | End: 2017-12-12

## 2017-12-12 RX ORDER — ANASTROZOLE 1 MG/1
1 TABLET ORAL DAILY
Status: DISCONTINUED | OUTPATIENT
Start: 2017-12-13 | End: 2017-12-12

## 2017-12-12 RX ORDER — ONDANSETRON 2 MG/ML
4 INJECTION INTRAMUSCULAR; INTRAVENOUS ONCE AS NEEDED
Status: DISCONTINUED | OUTPATIENT
Start: 2017-12-12 | End: 2017-12-12 | Stop reason: HOSPADM

## 2017-12-12 RX ORDER — ONDANSETRON 4 MG/1
4 TABLET, FILM COATED ORAL EVERY 6 HOURS PRN
Status: DISCONTINUED | OUTPATIENT
Start: 2017-12-12 | End: 2017-12-13 | Stop reason: HOSPADM

## 2017-12-12 RX ORDER — SODIUM CHLORIDE 0.9 % (FLUSH) 0.9 %
1-10 SYRINGE (ML) INJECTION AS NEEDED
Status: DISCONTINUED | OUTPATIENT
Start: 2017-12-12 | End: 2017-12-12 | Stop reason: HOSPADM

## 2017-12-12 RX ORDER — HYDROCODONE BITARTRATE AND ACETAMINOPHEN 7.5; 325 MG/1; MG/1
1 TABLET ORAL EVERY 4 HOURS PRN
Status: DISCONTINUED | OUTPATIENT
Start: 2017-12-12 | End: 2017-12-13 | Stop reason: HOSPADM

## 2017-12-12 RX ORDER — SODIUM CHLORIDE, SODIUM LACTATE, POTASSIUM CHLORIDE, CALCIUM CHLORIDE 600; 310; 30; 20 MG/100ML; MG/100ML; MG/100ML; MG/100ML
INJECTION, SOLUTION INTRAVENOUS CONTINUOUS PRN
Status: DISCONTINUED | OUTPATIENT
Start: 2017-12-12 | End: 2017-12-12 | Stop reason: SURG

## 2017-12-12 RX ORDER — LABETALOL HYDROCHLORIDE 5 MG/ML
10 INJECTION, SOLUTION INTRAVENOUS
Status: DISCONTINUED | OUTPATIENT
Start: 2017-12-12 | End: 2017-12-13 | Stop reason: HOSPADM

## 2017-12-12 RX ORDER — CHLORHEXIDINE GLUCONATE 0.12 MG/ML
30 RINSE ORAL EVERY 12 HOURS SCHEDULED
Status: DISCONTINUED | OUTPATIENT
Start: 2017-12-12 | End: 2017-12-12

## 2017-12-12 RX ORDER — SODIUM CHLORIDE 9 MG/ML
INJECTION, SOLUTION INTRAVENOUS AS NEEDED
Status: DISCONTINUED | OUTPATIENT
Start: 2017-12-12 | End: 2017-12-12 | Stop reason: HOSPADM

## 2017-12-12 RX ORDER — PANTOPRAZOLE SODIUM 40 MG/10ML
40 INJECTION, POWDER, LYOPHILIZED, FOR SOLUTION INTRAVENOUS ONCE
Status: COMPLETED | OUTPATIENT
Start: 2017-12-12 | End: 2017-12-12

## 2017-12-12 RX ORDER — HYDROMORPHONE HYDROCHLORIDE 1 MG/ML
0.5 INJECTION, SOLUTION INTRAMUSCULAR; INTRAVENOUS; SUBCUTANEOUS
Status: DISCONTINUED | OUTPATIENT
Start: 2017-12-12 | End: 2017-12-13 | Stop reason: HOSPADM

## 2017-12-12 RX ADMIN — ANASTROZOLE 1 MG: 1 TABLET, COATED ORAL at 21:48

## 2017-12-12 RX ADMIN — PANTOPRAZOLE SODIUM 40 MG: 40 INJECTION, POWDER, FOR SOLUTION INTRAVENOUS at 08:34

## 2017-12-12 RX ADMIN — SODIUM CHLORIDE 150 ML/HR: 9 INJECTION, SOLUTION INTRAVENOUS at 08:35

## 2017-12-12 RX ADMIN — BUPROPION HYDROCHLORIDE 150 MG: 150 TABLET, EXTENDED RELEASE ORAL at 20:52

## 2017-12-12 RX ADMIN — SODIUM CHLORIDE, POTASSIUM CHLORIDE, SODIUM LACTATE AND CALCIUM CHLORIDE 150 ML/HR: 600; 310; 30; 20 INJECTION, SOLUTION INTRAVENOUS at 17:39

## 2017-12-12 RX ADMIN — SODIUM CHLORIDE 1000 ML: 9 INJECTION, SOLUTION INTRAVENOUS at 08:34

## 2017-12-12 RX ADMIN — PROPOFOL 25 MCG/KG/MIN: 10 INJECTION, EMULSION INTRAVENOUS at 10:47

## 2017-12-12 RX ADMIN — CEFAZOLIN SODIUM 2 G: 2 INJECTION, SOLUTION INTRAVENOUS at 10:41

## 2017-12-12 RX ADMIN — WATER 3 G: 1 INJECTION INTRAMUSCULAR; INTRAVENOUS; SUBCUTANEOUS at 17:39

## 2017-12-12 RX ADMIN — HYDROMORPHONE HYDROCHLORIDE 2 MG: 2 TABLET ORAL at 21:48

## 2017-12-12 RX ADMIN — SODIUM CHLORIDE, POTASSIUM CHLORIDE, SODIUM LACTATE AND CALCIUM CHLORIDE: 600; 310; 30; 20 INJECTION, SOLUTION INTRAVENOUS at 10:39

## 2017-12-12 RX ADMIN — METOPROLOL TARTRATE 5 MG: 5 INJECTION, SOLUTION INTRAVENOUS at 11:15

## 2017-12-12 RX ADMIN — CHLORHEXIDINE GLUCONATE 30 ML: 1.2 RINSE ORAL at 08:43

## 2017-12-12 RX ADMIN — SCOPALAMINE 1 PATCH: 1 PATCH, EXTENDED RELEASE TRANSDERMAL at 08:33

## 2017-12-12 RX ADMIN — LIDOCAINE HYDROCHLORIDE 0.2 ML: 10 INJECTION, SOLUTION EPIDURAL; INFILTRATION; INTRACAUDAL; PERINEURAL at 08:34

## 2017-12-12 RX ADMIN — ROCURONIUM BROMIDE 50 MG: 10 INJECTION INTRAVENOUS at 10:44

## 2017-12-12 RX ADMIN — LIDOCAINE HYDROCHLORIDE 50 MG: 10 INJECTION, SOLUTION INFILTRATION; PERINEURAL at 10:44

## 2017-12-12 RX ADMIN — HYDROMORPHONE HYDROCHLORIDE 0.5 MG: 1 INJECTION, SOLUTION INTRAMUSCULAR; INTRAVENOUS; SUBCUTANEOUS at 15:03

## 2017-12-12 RX ADMIN — HYDRALAZINE HYDROCHLORIDE 5 MG: 20 INJECTION INTRAMUSCULAR; INTRAVENOUS at 13:57

## 2017-12-12 RX ADMIN — DEXAMETHASONE SODIUM PHOSPHATE 60 ML: 10 INJECTION, SOLUTION INTRAMUSCULAR; INTRAVENOUS at 10:46

## 2017-12-12 RX ADMIN — HYDROMORPHONE HYDROCHLORIDE 2 MG: 2 TABLET ORAL at 17:39

## 2017-12-12 RX ADMIN — PROPOFOL 150 MG: 10 INJECTION, EMULSION INTRAVENOUS at 10:44

## 2017-12-12 RX ADMIN — METOPROLOL TARTRATE 25 MG: 25 TABLET ORAL at 17:38

## 2017-12-12 RX ADMIN — DEXAMETHASONE SODIUM PHOSPHATE 6 MG: 4 INJECTION, SOLUTION INTRAMUSCULAR; INTRAVENOUS at 10:59

## 2017-12-12 NOTE — OP NOTE
OPERATIVE REPORT    DATE: 12/12/17    PATIENT: Sonya Peterson    PREOPERATIVE DIAGNOSIS:    1. Obesity with multiple comorbidities  2.  Hiatal hernia     POSTOPERATIVE DIAGNOSIS:    1. Obesity with multiple comorbidities  2.  Hiatal hernia     PROCEDURES PERFORMED:  1. Laparoscopic sleeve gastrectomy (85% subtotal vertical gastrectomy) over a  36-Malawian bougie dilator.   2.  Esophagogastroduodenoscopy  3.  Hiatal hernia repair     SURGEON:  Alexandra Elmore M.D.    ANESTHESIA:  General endotracheal with DEMARCO block    ESTIMATED BLOOD LOSS:  25 mL    FLUIDS:  Crystalloids.    SPECIMENS:  Subtotal gastrectomy.    DRAINS:  None.    COUNTS:  Correct.    COMPLICATIONS:  None.    FINDINGS: small hiatal hernia, adhesions to umbilicus    INDICATIONS:   Sonay Peterson is a 57 y.o. year old female with morbid obesity and associated comorbidities who presents for elective laparoscopic sleeve gastrectomy. The patient has has undergone our extensive preoperative education, teaching, and consent process. Everything is in order and they wish to proceed. Preoperative EGD showed hiatal hernia, and this will be repaired today also.    DESCRIPTION OF PROCEDURE:   The patient was brought to the operating room and placed supine upon the operating room table. SCDs were placed. The patient underwent uneventful general endotracheal anesthesia and bilateral DEMARCO blocks per the anesthesiology staff.  She received subcutaneous Lovenox and was given 2 g Ancef. The abdomen was prepped with ChloraPrep and draped in the usual sterile fashion. An Ioban was used as well.  Timeout was performed.       A small transverse incision was made a few centimeters above and to the left of the umbilicus and the peritoneal cavity entered under direct camera visualization using a 5 mm 0° laparoscope and an Optiview trocar. The abdomen was then insufflated to a pressure of 16 mmHg with CO2 gas. Exploratory laparoscopy revealed no evidence of injury from  the entrance technique. There were some omental adhesions to in the umbilicus that were taken down with cautery before placing the right-sided ports (photodocumented).  The falciform ligament was generous, and extended 16 cm inferior to costal margin.  The liver had a uniform capsule and was moderate in size without evidence of gross hepatomegaly or fibrosis.  A 5 mm port was placed lateral and to the left to the first port and a 5 mm port was placed a handsbreadth lateral to that on the left.  A 5 mm port was placed in the right mid abdomen laterally and a 15 mm port was placed just left of the umbilicus.  A Sidney liver retractor was placed through a small subxiphoid stab incision and the the left lobe of the liver was elevated.       The stomach was decompressed with an orogastric tube, which was then withdrawn back into the esophagus. The greater curvature vessels were taken down with a Harmonic scalpel beginning at the midpoint of the stomach and continued up to the angle of His, including the short gastrics. The left crow was completely exposed and there was a small hiatal hernia here. This was photodocumented. The GE junction fat pad was elevated to make a clear landing zone for the stapler later. The greater curvature vessels were taken down with the Harmonic scalpel extending distally within 3 cm of the pylorus. Filmy adhesions to the stomach were taken down posteriorly.     I turned my attention to the hiatal hernia repair.  I incised the hernia sac from the left crow of the diaphragm.  I incised the phrenoesophageal ligament with Harmonic scalpel. The pars flaccida was opened (there was no replaced hepatic vessel) and the right crow was exposed.  I incised the hernia sac from the right crow.  An instrument was passed under the esophagus at the base of the hiatus and brought easily out the other side.  A penrose drain was placed around the esophagus for retraction.  The esophagus was mobilized into the  abdomen 5 cm.  A posterior cruroplasty was performed with four 0 silk stitches.  The crura came together without tension and repair was photodocumented.  The penrose was removed.  The time to repair the hernia was documented and was 35 minutes.      The OGT was removed, and the 36 Malay bougie dilator was passed transorally and positioned along the lesser curvature of the stomach with the tip at the pylorus. Using the bougie as template, a sleeve gastrectomy was performed with an articulating 60 mm [place] stapler bolstered by single Sandra-strips. The first load was black, and the rest of the loads (5) were green.The bougie was removed before the last staple load was fired. Care was taken to stay 1 cm away from the esophagus to prevent any esophagus fibers from being divided. The staple line was examined. There were several points of bleeding that required cautery for hemostasis. The gastrectomy specimen was removed through the 15 mm port site in a specimen bag. The specimen was later examined, and the staples were well-formed. Palpation of the specimen revealed no masses. The staple line of the sleeve gastrectomy revealed staples that were well-formed.      The flexible endoscope was passed transorally down to the pylorus easily. The sleeve extended to within  2-3 cm proximal to the pylorus and was hemostatic. The sleeve was not narrow or twisted. There was no persistent hiatal hernia or distal esophagitis. The rest of the esophagus was normal. The scope was removed. The leak test was normal.    I rescrubbed and suctioned the irrigation fluid from the upper abdomen, making sure it was dry. The staple line on the stomach required some more hemostasis with cautery. The staple line was treated with 4 ml of aerosolized Tisseel fibrin glue.  The liver retractor was removed easily.      The 15 mm port was removed under direct visualization and there was no bleeding. This incision was closed with an 0-vicryl transfascial  suture using a suture passer under direct visualization in a horizontal mattress fashion. All other ports were removed and then replaced under direct visualization and all of these were hemostatic. The instruments were removed and the abdomen was desufflated. The ports were removed. A 2-0 vicryl was used to close the subcutaneous tissue in the 15 mm port site. 3-0 monocryl plus was used to close skin incisions with interrupted sutures. Skin Affix skin glue was placed. The patient was awakened and taken to recovery in good condition, having tolerated the procedure well. All sponge, needle, and instrument counts were correct.

## 2017-12-12 NOTE — PLAN OF CARE
Problem: Patient Care Overview (Adult)  Goal: Plan of Care Review  Outcome: Ongoing (interventions implemented as appropriate)    12/12/17 1452   Coping/Psychosocial Response Interventions   Plan Of Care Reviewed With patient;spouse   Patient Care Overview   Progress progress toward functional goals as expected   Outcome Evaluation   Outcome Summary/Follow up Plan States her shoulders hurt. Resting in bed at this time       Goal: Adult Individualization and Mutuality  Outcome: Ongoing (interventions implemented as appropriate)    12/12/17 1452   Individualization   Patient Specific Interventions Monitor pain q2h and prn         Problem: Bariatric Surgery (Open/Laparoscopic) (Adult,Pediatric)  Goal: Signs and Symptoms of Listed Potential Problems Will be Absent or Manageable (Bariatric Surgery)  Outcome: Ongoing (interventions implemented as appropriate)    12/12/17 1452   Bariatric Surgery (Open/Laparoscopic)   Problems Assessed (Bariatric Surgery) all   Problems Present (Bariatric Surgery) pain

## 2017-12-12 NOTE — ANESTHESIA PROCEDURE NOTES
Bilat Subcostal TAPs    Patient location during procedure: OR  Reason for block: at surgeon's request and post-op pain management  Performed by  Anesthesiologist: LAKEISHA NEW  Assisted by: CISCO FELICIANO  Preanesthetic Checklist  Completed: patient identified, site marked, surgical consent, pre-op evaluation, timeout performed, IV checked, risks and benefits discussed and monitors and equipment checked  Prep:  Pt Position: supine  Sterile barriers:cap, gloves, sterile barriers and mask  Prep: ChloraPrep  Patient monitoring: blood pressure monitoring, continuous pulse oximetry and EKG  Procedure  Sedation:yes  Performed under: general  Guidance:ultrasound guided  Images:still images obtained    Laterality:Bilateral  Block Type:TAP  Injection Technique:single-shot  Needle Type:short-bevel and echogenic  Needle Gauge:20 G    Medications  Comment:Block Injection:  LA dose divided between Right and Left block       Adjuncts:  Decadron 4mg PSF, Buprenex 0.3mg (Per total volume of LA)  Local Injected:bupivacaine 0.25% Local Amount Injected:60mL  Post Assessment  Injection Assessment: negative aspiration for heme, incremental injection and no paresthesia on injection  Patient Tolerance:comfortable throughout block  Complications:no  Additional Notes      Under Ultrasound guidance, a BBraun 4inch 360 degree needle was advanced with Normal Saline hydro dissection of tissue.  The Internal Oblique and Transversus Abdominus muscles where visualized.  At or before the aponeurosis of Internal Oblique, local anesthetic spread was visualized in the Transversus Abdominus Plane. Injection was made incrementally with aspiration every 5 mls.  There was no  intravascular injection,  injection pressure was normal, there was no neural injection, and the procedure was completed without difficulty.  Thank You.

## 2017-12-12 NOTE — H&P (VIEW-ONLY)
"[facility] BARIATRIC SURGERY  [address]  [facility] [address] [phone]      Patient  Name:  Sonya Peterson  :  1960      Date of Visit: 2017      Chief Complaint:  weight gain; unable to maintain weight loss    History of Present Illness:  Sonya Peterson is a 57 y.o. female who presents today for evaluation, education and consultation regarding weight loss surgery.     Patient has been overweight for many years, with numerous failed dietary/weight loss attempts.  She now has obesity related comorbidities of hypertension, depression, fatigue, heartburn and as such has decided to pursue weight loss surgery.    There has been no change in the medical history.  She takes Arimidex as hormonal therapy for breast cancer, which is in remission.  Her oncologist has provided clearance, but no discussion has been made about whether or not to hold Arimidex before and after surgery.      Review of data:    EGD: 17 @ , +HH, \"chemical gastritis\", no esophageal bx  CBC: ok  CMP: ok  HP serum negative  Oncology clearance re: off Arimidex: \"In clinical remission.\"  No mention of Arimidex.     Cardiac clearance: moderate and acceptable risk  Echo: nl LV, no valve lesions  Stress test: normal  Pulm clearance: cleared  EKG: NSR  CXR: ok  RICK: Negative        Last tobacco: n/a  Last NSAIDs: 2 weeks ago  Last ASA: n/a  Last steroids: n/a  Last hormones: Arimidex (aromatose inhibitor)      Past Medical History:   Diagnosis Date   • Breast cancer     dx 2015, stage 2, s/p lumpectomy, then chemo/XRT, finished 2016 - follows @ [facility], mammograms q 6months, last 2017 OK   • Depression    • Dyspepsia    • Fatigue    • Heartburn     BID Zantac   • HTN (hypertension)    • Obesity      Past Surgical History:   Procedure Laterality Date   • BREAST LUMPECTOMY Left     for stage 2 breast CA   •  SECTION     • COLONOSCOPY      unremarkable   • TONSILLECTOMY  " 1974   • TOTAL ABDOMINAL HYSTERECTOMY  2004    endometriosis   • TUBAL ABDOMINAL LIGATION  1999   • TYMPANOSTOMY TUBE PLACEMENT  1974       Allergies   Allergen Reactions   • Ace Inhibitors Swelling   • Sulfa Antibiotics      rigors/chills       Current Outpatient Prescriptions:   •  anastrozole (ARIMIDEX) 1 MG tablet, Take  by mouth Daily., Disp: , Rfl:   •  Biotin 10 MG capsule, Take 10 mg by mouth Daily., Disp: , Rfl:   •  buPROPion XL (WELLBUTRIN XL) 150 MG 24 hr tablet, Take 150 mg by mouth Daily., Disp: , Rfl:   •  hydrochlorothiazide (HYDRODIURIL) 25 MG tablet, Take 25 mg by mouth Daily., Disp: , Rfl:   •  melatonin 5 MG tablet tablet, Take 5 mg by mouth Every Night., Disp: , Rfl:   •  metoprolol tartrate (LOPRESSOR) 25 MG tablet, Take 25 mg by mouth 2 (Two) Times a Day., Disp: , Rfl:   •  Multiple Vitamins-Minerals (MULTIVITAMIN ADULT PO), Take  by mouth Daily., Disp: , Rfl:   •  raNITIdine (ZANTAC) 150 MG tablet, Take 150 mg by mouth Every Night., Disp: , Rfl:   •  vitamin E 1000 UNIT capsule, Take 1,000 Units by mouth Daily., Disp: , Rfl:     Social History     Social History   • Marital status:      Spouse name: Tavo Davis   • Number of children: N/A   • Years of education: Masters degree     Occupational History   • [employer]     Social History Main Topics   • Smoking status: Never Smoker   • Smokeless tobacco: Never Used   • Alcohol use Yes      Comment: Less than 1 per mo. 1 drink each time for 30 yrs   • Drug use: No   • Sexual activity: Not on file     Other Topics Concern   • Not on file     Social History Narrative    Lives in [place], [place] w/ .   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[employer]     Family History   Problem Relation Age of Onset   • Obesity Mother    • Diabetes Father    • Hypertension Father    • Heart disease Father    • Hypertension Brother    • Hypertension Maternal Grandmother    • Stroke Maternal Grandmother    • Heart disease  Maternal Grandmother    • COPD Maternal Grandmother    • Hypertension Maternal Grandfather    • Stroke Maternal Grandfather    • Heart disease Maternal Grandfather    • Obesity Paternal Grandmother    • Cerebral aneurysm Paternal Grandfather        Review of Systems:  Constitutional:  The patient reports fatigue, weight gain and denies fevers and chills.  Cardiovascular:  The patient reports HTN and denies heart disease.  Respiratory:  The patient reports none and denies asthma, apnea and PE.  Gastrointestinal:  The patient reports heartburn and denies pancreatitis, liver disease and IBS.  Genitourinary:  The patient denies renal insufficiency.    Musculoskeletal:  The patient reports none and denies fibromyalgia and autoimmune disease.  Neurological:  The patient reports none and denies seizure and stroke.  Psychiatric:  The patient reports depression and denies anxiety and bipolar disorder.  Endocrine:  The patient reports none and denies diabetes, thyroid disease and gout.  Hematologic:  The patient reports none and denies anemia and bleeding disorder.  Skin:  The patient denies MRSA.    Physical Exam:  Vital Signs:  Weight: 234 lb 8 oz (106 kg)   Body mass index is 40.25 kg/(m^2).  Temp: 98 °F (36.7 °C)   Heart Rate: 66   BP: 143/87     Physical Exam   Constitutional: She is oriented to person, place, and time. She appears well-developed and well-nourished. No distress.   HENT:   Head: Normocephalic and atraumatic.   Mouth/Throat: No oropharyngeal exudate.   Eyes: Conjunctivae and EOM are normal. Pupils are equal, round, and reactive to light. No scleral icterus.   Neck: Normal range of motion. Neck supple. No thyromegaly present.   Cardiovascular: Normal rate, regular rhythm and normal heart sounds.    Pulmonary/Chest: Effort normal and breath sounds normal. No respiratory distress.   Abdominal: Soft. She exhibits no distension. There is no tenderness.       Musculoskeletal: Normal range of motion. She exhibits  no edema or deformity.   Neurological: She is alert and oriented to person, place, and time. No cranial nerve deficit.   Skin: Skin is warm and dry. No rash noted. No erythema.   Psychiatric: She has a normal mood and affect. Her behavior is normal. Judgment and thought content normal.       Patient Active Problem List   Diagnosis   • HTN (hypertension)   • Depression   • Obesity   • Fatigue   • Heartburn   • Dyspepsia   • Breast cancer       Assessment:    Sonya Peterson is a 57 y.o. year old female with medically complicated obesity.    Weight loss surgery is deemed medically necessary given the following obesity related comorbidities including hypertension, depression, fatigue, heartburn with current Weight: 234 lb 8 oz (106 kg) and Body mass index is 40.25 kg/(m^2)..    Patient is aware that surgery is a tool, and that weight loss is not guaranteed but only seen in the context of appropriate use, follow up and exercise.    The patient was present for an approximately a 2.5 hour discussion of the purpose of weight loss surgery, how WLS is a tool to assist in achieving weight loss goals, the most common complications and how best to avoid them, and the strategies for short and long term weight loss.  Ample opportunity to discuss questions was available both in group and during the time of individual examination.    I reviewed all available preop labs, Xrays, tests, clearances, etc and signed off on these in the record.  All of this in addition to the patient's unique history and exam has been taken into consideration in determining their appropriate candidacy for weight loss surgery.    Complications  of laparoscopic/possible robotic gastric sleeve were discussed. The patient is well aware of the potential complications of surgery that include but not limited to bleeding, infections, deep venous thrombosis, pulmonary embolism, pulmonary complications such as pneumonia, cardiac events, hernias, small bowel  "obstruction, damage to the spleen or other organs, bowel injury, disfiguring scars, failure to lose weight, need for additional surgery, conversion to an open procedure, and death. Patient is also aware of complications which apply in this particular procedure that can include but are not limited to a \"leak\" at the staple line which in some instances may require conversion to gastric bypass.    The patient is aware if a hiatal hernia is encountered, it likely will be repaired.  R/B/A Rx to hiatal hernia repair were discussed as outlined in our long consent form.  Briefly risks in addition to those for LSG include recurrent hernia, WINDY, dysphagia, esophageal injury, pneumothorax, injury to the vagus nerves, injury to the thoracic duct, aorta or vena cava.    Greater than 3 minutes was spent with the patient discussing avoiding all tobacco products and second hand smoke at least 2 weeks pre-operatively and 6 weeks post-operatively to minimize the risk of sleeve leak.  This included discussing the importance of avoiding even secondhand smoke as the risk of leak is increased.  Examples discussed:  I made it very clear that the patient understands they should avoid even riding in a car where someone has previously smoked in the last 2 weeks, living in a house where someone smokes (even if it's in a separate room/patio/attached garage, etc.) we discussed that they should not have a conversation with a group of people who are smoking even if it's outside.  They can be around wood burning fires and barbecue.  I told them I do not know if marijuana has a same effects but my overall recommendation is to avoid it for 2 weeks prior in 6 weeks after surgery.  They also are aware that nicotine may also increase the risk of leak and I strongly encouraged him to avoid that as well for 2 weeks prior in 6 weeks after surgery.    Discussed the risks, benefits and alternative therapies at great length as outlined in our extensive " consent forms, consent videos, and educational teaching process under the direction of the center's .    A copy of the patient's signed informed consent is on file.    Plan:  Laparoscopic sleeve gastrectomy and hiatal hernia repair.  I have called her oncologist (Dr. Christine Hartmann [phone]) to discuss the risk:benefit ratio. R/b/a of holding vs. Continuing Arimidex explained to patient, and possible increased risk of VTE.  The patient will continue Arimidex, and will rx 3 weeks of Eliquis to cover post-operative VTE risk on aromatase inhibitor.        Alexandra Elmore MD

## 2017-12-12 NOTE — BRIEF OP NOTE
GASTRIC SLEEVE LAPAROSCOPIC, ESOPHAGOGASTRODUODENOSCOPY  Progress Note    Sonya Peterson  12/12/2017    Pre-op Diagnosis:   Obesity, Class II, BMI 35-39.9 [E66.9]       Post-Op Diagnosis Codes:     * Obesity, Class II, BMI 35-39.9 [E66.9]    Procedure/CPT® Codes:      Procedure(s):  GASTRIC SLEEVE LAPAROSCOPIC, HIATAL HERNIA REPAIR LAPAROSCOPIC  ESOPHAGOGASTRODUODENOSCOPY    Surgeon(s):  Alexandra Elmore MD    Anesthesia: General with Block    Staff:   Circulator: Liliya Kline RN; Mini Duncan RN  Scrub Person: Tammie Codrero; Apurva Dash  Nursing Assistant: Selin Long    Estimated Blood Loss: 25 mL    Urine Voided: * No values recorded between 12/12/2017 10:39 AM and 12/12/2017 12:59 PM *    Specimens:                A: subtotal gastrectomy      Drains:       none    Findings: small hiatal hernia, adhesions to umbilicus    Complications: none immediate      Alexandra Elmore MD     Date: 12/12/2017  Time: 12:59 PM

## 2017-12-12 NOTE — ANESTHESIA PROCEDURE NOTES
Airway  Urgency: elective    Airway not difficult    General Information and Staff    Patient location during procedure: OR  CRNA: CISCO FELICIANO    Indications and Patient Condition  Indications for airway management: airway protection    Preoxygenated: yes  MILS not maintained throughout  Mask difficulty assessment: 1 - vent by mask    Final Airway Details  Final airway type: endotracheal airway      Successful airway: ETT  Cuffed: yes   Successful intubation technique: direct laryngoscopy  Facilitating devices/methods: intubating stylet  Endotracheal tube insertion site: oral  Blade: Pratt  Blade size: #2  ETT size: 7.5 mm  Cormack-Lehane Classification: grade I - full view of glottis  Placement verified by: chest auscultation and capnometry   Measured from: lips  ETT to lips (cm): 20  Number of attempts at approach: 1    Additional Comments  Negative epigastric sounds, Breath sound equal bilaterally with symmetric chest rise and fall.  Teeth intact, atraumatic

## 2017-12-12 NOTE — ANESTHESIA PREPROCEDURE EVALUATION
Anesthesia Evaluation     Patient summary reviewed and Nursing notes reviewed   no history of anesthetic complications:  NPO Solid Status: > 8 hours  NPO Liquid Status: > 2 hours     Airway   Mallampati: II  TM distance: >3 FB  Neck ROM: full  no difficulty expected  Dental - normal exam     Pulmonary    (+) decreased breath sounds,   Cardiovascular   Exercise tolerance: good (4-7 METS)    Rhythm: regular  Rate: normal    (+) hypertension well controlled,       Neuro/Psych  (+) psychiatric history Depression,    GI/Hepatic/Renal/Endo    (+) morbid obesity, hiatal hernia,   (-)  obesity    Musculoskeletal     Abdominal   (+) obese,     Abdomen: soft.   Substance History      OB/GYN          Other      history of cancer remission    ROS/Med Hx Other: H/O BREAST CANCER  ELIQUIS FOR POST-OP, PT NOT CURRENTLY TAKING                                  Anesthesia Plan    ASA 3     general and regional     intravenous induction   Anesthetic plan and risks discussed with patient.    Plan discussed with CRNA.

## 2017-12-12 NOTE — ANESTHESIA POSTPROCEDURE EVALUATION
Patient: Sonya Peterson    Procedure Summary     Date Anesthesia Start Anesthesia Stop Room / Location    12/12/17 1039 1304 [facility] OR 20 / [facility]       Procedure Diagnosis Surgeon Provider    GASTRIC SLEEVE LAPAROSCOPIC, HIATAL HERNIA REPAIR LAPAROSCOPIC (N/A Abdomen); ESOPHAGOGASTRODUODENOSCOPY (N/A Esophagus) Obesity, Class II, BMI 35-39.9  (Obesity, Class II, BMI 35-39.9 [E66.9]) MD Katherine Ramsay MD          Anesthesia Type: general, regional  Last vitals  /65   Temp   97.2   Pulse   75   Resp   12   SpO2   94     Post Anesthesia Care and Evaluation    Patient location during evaluation: PACU  Patient participation: complete - patient participated  Level of consciousness: sleepy but conscious  Pain score: 0  Pain management: adequate  Airway patency: patent  Anesthetic complications: No anesthetic complications  PONV Status: none  Cardiovascular status: hemodynamically stable and acceptable  Respiratory status: nonlabored ventilation, acceptable, nasal cannula and oral airway  Hydration status: acceptable

## 2017-12-12 NOTE — PLAN OF CARE
Problem: Perioperative Period (Adult)  Goal: Signs and Symptoms of Listed Potential Problems Will be Absent or Manageable (Perioperative Period)  Outcome: Ongoing (interventions implemented as appropriate)    12/12/17 4668   Perioperative Period   Problems Assessed (Perioperative Period) pain   Problems Present (Perioperative Period) none

## 2017-12-13 ENCOUNTER — APPOINTMENT (OUTPATIENT)
Dept: GENERAL RADIOLOGY | Facility: HOSPITAL | Age: 57
End: 2017-12-13
Attending: SURGERY

## 2017-12-13 VITALS
HEIGHT: 64 IN | OXYGEN SATURATION: 94 % | BODY MASS INDEX: 40.04 KG/M2 | SYSTOLIC BLOOD PRESSURE: 152 MMHG | TEMPERATURE: 98.5 F | RESPIRATION RATE: 16 BRPM | WEIGHT: 234.5 LBS | DIASTOLIC BLOOD PRESSURE: 78 MMHG | HEART RATE: 80 BPM

## 2017-12-13 LAB
ALBUMIN SERPL-MCNC: 3.6 G/DL (ref 3.2–4.8)
ALBUMIN/GLOB SERPL: 1.6 G/DL (ref 1.5–2.5)
ALP SERPL-CCNC: 78 U/L (ref 25–100)
ALT SERPL W P-5'-P-CCNC: 80 U/L (ref 7–40)
ANION GAP SERPL CALCULATED.3IONS-SCNC: 7 MMOL/L (ref 3–11)
AST SERPL-CCNC: 74 U/L (ref 0–33)
BASOPHILS # BLD AUTO: 0.02 10*3/MM3 (ref 0–0.2)
BASOPHILS NFR BLD AUTO: 0.2 % (ref 0–1)
BILIRUB SERPL-MCNC: 0.3 MG/DL (ref 0.3–1.2)
BUN BLD-MCNC: 14 MG/DL (ref 9–23)
BUN/CREAT SERPL: 17.5 (ref 7–25)
CALCIUM SPEC-SCNC: 8.9 MG/DL (ref 8.7–10.4)
CHLORIDE SERPL-SCNC: 103 MMOL/L (ref 99–109)
CO2 SERPL-SCNC: 26 MMOL/L (ref 20–31)
CREAT BLD-MCNC: 0.8 MG/DL (ref 0.6–1.3)
CYTO UR: NORMAL
DEPRECATED RDW RBC AUTO: 44 FL (ref 37–54)
EOSINOPHIL # BLD AUTO: 0.01 10*3/MM3 (ref 0–0.3)
EOSINOPHIL NFR BLD AUTO: 0.1 % (ref 0–3)
ERYTHROCYTE [DISTWIDTH] IN BLOOD BY AUTOMATED COUNT: 12.8 % (ref 11.3–14.5)
GFR SERPL CREATININE-BSD FRML MDRD: 74 ML/MIN/1.73
GLOBULIN UR ELPH-MCNC: 2.2 GM/DL
GLUCOSE BLD-MCNC: 105 MG/DL (ref 70–100)
HCT VFR BLD AUTO: 40.6 % (ref 34.5–44)
HGB BLD-MCNC: 13.8 G/DL (ref 11.5–15.5)
IMM GRANULOCYTES # BLD: 0.03 10*3/MM3 (ref 0–0.03)
IMM GRANULOCYTES NFR BLD: 0.2 % (ref 0–0.6)
IRON 24H UR-MRATE: 47 MCG/DL (ref 50–175)
LAB AP CASE REPORT: NORMAL
LAB AP CLINICAL INFORMATION: NORMAL
LYMPHOCYTES # BLD AUTO: 1.99 10*3/MM3 (ref 0.6–4.8)
LYMPHOCYTES NFR BLD AUTO: 16.4 % (ref 24–44)
Lab: NORMAL
MCH RBC QN AUTO: 31.7 PG (ref 27–31)
MCHC RBC AUTO-ENTMCNC: 34 G/DL (ref 32–36)
MCV RBC AUTO: 93.3 FL (ref 80–99)
MONOCYTES # BLD AUTO: 1.29 10*3/MM3 (ref 0–1)
MONOCYTES NFR BLD AUTO: 10.7 % (ref 0–12)
NEUTROPHILS # BLD AUTO: 8.76 10*3/MM3 (ref 1.5–8.3)
NEUTROPHILS NFR BLD AUTO: 72.4 % (ref 41–71)
PATH REPORT.FINAL DX SPEC: NORMAL
PATH REPORT.GROSS SPEC: NORMAL
PLATELET # BLD AUTO: 307 10*3/MM3 (ref 150–450)
PMV BLD AUTO: 10.1 FL (ref 6–12)
POTASSIUM BLD-SCNC: 3.8 MMOL/L (ref 3.5–5.5)
PROT SERPL-MCNC: 5.8 G/DL (ref 5.7–8.2)
RBC # BLD AUTO: 4.35 10*6/MM3 (ref 3.89–5.14)
SODIUM BLD-SCNC: 136 MMOL/L (ref 132–146)
WBC NRBC COR # BLD: 12.1 10*3/MM3 (ref 3.5–10.8)

## 2017-12-13 PROCEDURE — 25010000002 NA FERRIC GLUC CPLX PER 12.5 MG: Performed by: SURGERY

## 2017-12-13 PROCEDURE — 25010000002 CYANOCOBALAMIN PER 1000 MCG: Performed by: SURGERY

## 2017-12-13 PROCEDURE — 85025 COMPLETE CBC W/AUTO DIFF WBC: CPT | Performed by: SURGERY

## 2017-12-13 PROCEDURE — 25010000002 ENOXAPARIN PER 10 MG: Performed by: SURGERY

## 2017-12-13 PROCEDURE — 99024 POSTOP FOLLOW-UP VISIT: CPT | Performed by: SURGERY

## 2017-12-13 PROCEDURE — 83540 ASSAY OF IRON: CPT | Performed by: SURGERY

## 2017-12-13 PROCEDURE — 80053 COMPREHEN METABOLIC PANEL: CPT | Performed by: SURGERY

## 2017-12-13 PROCEDURE — 25010000002 THIAMINE PER 100 MG: Performed by: SURGERY

## 2017-12-13 PROCEDURE — 0 DIATRIZOATE MEGLUMINE & SODIUM PER 1 ML: Performed by: SURGERY

## 2017-12-13 PROCEDURE — 25010000002 PYRIDOXINE PER 100 MG: Performed by: SURGERY

## 2017-12-13 PROCEDURE — 74241: CPT

## 2017-12-13 PROCEDURE — 25010000003 CEFAZOLIN PER 500 MG: Performed by: SURGERY

## 2017-12-13 RX ORDER — OMEPRAZOLE 40 MG/1
40 CAPSULE, DELAYED RELEASE ORAL DAILY
Qty: 60 CAPSULE | Refills: 1 | Status: SHIPPED | OUTPATIENT
Start: 2017-12-13 | End: 2018-02-11

## 2017-12-13 RX ORDER — HYDROMORPHONE HYDROCHLORIDE 2 MG/1
2 TABLET ORAL EVERY 4 HOURS PRN
Qty: 30 TABLET | Refills: 0 | Status: SHIPPED | OUTPATIENT
Start: 2017-12-13 | End: 2018-01-10

## 2017-12-13 RX ORDER — ACETAMINOPHEN 325 MG/1
650 TABLET ORAL EVERY 6 HOURS PRN
Status: DISCONTINUED | OUTPATIENT
Start: 2017-12-13 | End: 2017-12-13 | Stop reason: HOSPADM

## 2017-12-13 RX ADMIN — HYDROCODONE BITARTRATE AND ACETAMINOPHEN 1 TABLET: 7.5; 325 TABLET ORAL at 13:44

## 2017-12-13 RX ADMIN — WATER 3 G: 1 INJECTION INTRAMUSCULAR; INTRAVENOUS; SUBCUTANEOUS at 03:13

## 2017-12-13 RX ADMIN — METOPROLOL TARTRATE 25 MG: 25 TABLET ORAL at 08:35

## 2017-12-13 RX ADMIN — SIMETHICONE CHEW TAB 80 MG 80 MG: 80 TABLET ORAL at 06:20

## 2017-12-13 RX ADMIN — FOLIC ACID 250 ML/HR: 5 INJECTION, SOLUTION INTRAMUSCULAR; INTRAVENOUS; SUBCUTANEOUS at 08:35

## 2017-12-13 RX ADMIN — PANTOPRAZOLE SODIUM 40 MG: 40 INJECTION, POWDER, FOR SOLUTION INTRAVENOUS at 05:26

## 2017-12-13 RX ADMIN — CYANOCOBALAMIN 1000 MCG: 1000 INJECTION, SOLUTION INTRAMUSCULAR; SUBCUTANEOUS at 08:35

## 2017-12-13 RX ADMIN — ACETAMINOPHEN 650 MG: 325 TABLET ORAL at 09:54

## 2017-12-13 RX ADMIN — BUPROPION HYDROCHLORIDE 150 MG: 150 TABLET, EXTENDED RELEASE ORAL at 08:35

## 2017-12-13 RX ADMIN — ENOXAPARIN SODIUM 40 MG: 40 INJECTION SUBCUTANEOUS at 08:35

## 2017-12-13 RX ADMIN — Medication 30 ML: at 09:30

## 2017-12-13 RX ADMIN — SODIUM CHLORIDE 125 MG: 9 INJECTION, SOLUTION INTRAVENOUS at 13:44

## 2017-12-13 RX ADMIN — CETIRIZINE HYDROCHLORIDE 10 MG: 10 TABLET, FILM COATED ORAL at 08:35

## 2017-12-13 RX ADMIN — HYDROCODONE BITARTRATE AND ACETAMINOPHEN 1 TABLET: 7.5; 325 TABLET ORAL at 06:20

## 2017-12-13 NOTE — PLAN OF CARE
Problem: Patient Care Overview (Adult)  Goal: Plan of Care Review  Outcome: Ongoing (interventions implemented as appropriate)    12/13/17 0439   Coping/Psychosocial Response Interventions   Plan Of Care Reviewed With patient   Patient Care Overview   Progress progress toward functional goals as expected   Outcome Evaluation   Outcome Summary/Follow up Plan Pt has been ambulating frequently, 4 times this shift. Has had very litlle c/o pain and no c/o n/v. Hopeful for discharge tomorrow.

## 2017-12-13 NOTE — PROGRESS NOTES
Cc: POD#1  Feels well.  There is a gentleman in the room with her.  She wants to go home.  She is tired diet without nausea or vomiting.  She says she is ambulating and voiding well.  No pulmonary complaints.  She says she has her Eliquis at home.  No fever or tachycardia pulse 80 blood pressure 152/78 she is in no apparent distress and is soft, nontender, nondistended, bowel sounds are active.  Wounds look okay CMP normal except for glucose of 105 SGPT 80 SGOT 74 iron is 47 white blood count 12.1 with 72 segs 16 lymphs 11 monocytes no bands H&H 14 and 41 upper GI is unremarkable    An okay    Plan: Discharge home.  Discharge instructions discussed.  See orders.  Has her Eliquis.

## 2017-12-13 NOTE — PLAN OF CARE
Problem: Patient Care Overview (Adult)  Goal: Plan of Care Review  Outcome: Ongoing (interventions implemented as appropriate)    12/13/17 0905   Coping/Psychosocial Response Interventions   Plan Of Care Reviewed With patient   Patient Care Overview   Progress improving   Outcome Evaluation   Outcome Summary/Follow up Plan Ambulated this am. Minimal pain, denies nasuea. Went for Upper Gi at this time       Goal: Adult Individualization and Mutuality  Outcome: Ongoing (interventions implemented as appropriate)    12/12/17 1452   Individualization   Patient Specific Interventions Monitor pain q2h and prn         Problem: Bariatric Surgery (Open/Laparoscopic) (Adult,Pediatric)  Goal: Signs and Symptoms of Listed Potential Problems Will be Absent or Manageable (Bariatric Surgery)  Outcome: Ongoing (interventions implemented as appropriate)    12/13/17 0905   Bariatric Surgery (Open/Laparoscopic)   Problems Assessed (Bariatric Surgery) all   Problems Present (Bariatric Surgery) pain

## 2017-12-19 ENCOUNTER — OFFICE VISIT (OUTPATIENT)
Dept: BARIATRICS/WEIGHT MGMT | Facility: CLINIC | Age: 57
End: 2017-12-19

## 2017-12-19 VITALS
WEIGHT: 219 LBS | RESPIRATION RATE: 18 BRPM | HEIGHT: 64 IN | SYSTOLIC BLOOD PRESSURE: 90 MMHG | BODY MASS INDEX: 37.39 KG/M2 | OXYGEN SATURATION: 99 % | HEART RATE: 84 BPM | TEMPERATURE: 98.1 F | DIASTOLIC BLOOD PRESSURE: 62 MMHG

## 2017-12-19 DIAGNOSIS — Z98.84 S/P BARIATRIC SURGERY: Primary | ICD-10-CM

## 2017-12-19 PROCEDURE — 99024 POSTOP FOLLOW-UP VISIT: CPT | Performed by: PHYSICIAN ASSISTANT

## 2017-12-19 RX ORDER — URSODIOL 300 MG/1
300 CAPSULE ORAL 2 TIMES DAILY
Qty: 60 CAPSULE | Refills: 5 | Status: SHIPPED | OUTPATIENT
Start: 2017-12-19 | End: 2017-12-19 | Stop reason: SDUPTHER

## 2017-12-19 RX ORDER — ONDANSETRON 4 MG/1
4 TABLET, FILM COATED ORAL EVERY 6 HOURS PRN
Qty: 21 TABLET | Refills: 0 | Status: SHIPPED | OUTPATIENT
Start: 2017-12-19 | End: 2017-12-19 | Stop reason: SDUPTHER

## 2017-12-19 RX ORDER — PROMETHAZINE HYDROCHLORIDE 25 MG/1
25 TABLET ORAL EVERY 6 HOURS PRN
Qty: 30 TABLET | Refills: 0 | Status: SHIPPED | OUTPATIENT
Start: 2017-12-19 | End: 2017-12-19 | Stop reason: SDUPTHER

## 2017-12-19 RX ORDER — PROMETHAZINE HYDROCHLORIDE 25 MG/1
25 TABLET ORAL EVERY 6 HOURS PRN
Qty: 30 TABLET | Refills: 0 | Status: SHIPPED | OUTPATIENT
Start: 2017-12-19 | End: 2018-01-10

## 2017-12-19 RX ORDER — URSODIOL 300 MG/1
300 CAPSULE ORAL 2 TIMES DAILY
Qty: 60 CAPSULE | Refills: 5 | Status: SHIPPED | OUTPATIENT
Start: 2017-12-19 | End: 2018-01-18

## 2017-12-19 RX ORDER — ONDANSETRON 4 MG/1
4 TABLET, FILM COATED ORAL EVERY 6 HOURS PRN
Qty: 21 TABLET | Refills: 0 | Status: SHIPPED | OUTPATIENT
Start: 2017-12-19 | End: 2018-01-10

## 2017-12-19 NOTE — PROGRESS NOTES
[facility] Bariatric Surgery  [address]  [facility] [address]  [phone]      Patient Name:  Sonya Peterson.  :  1960      Date of Visit: 2017      Reason for Visit:  POD # 7    HPI:  Sonya Peterson is a 57 y.o. female s/p LSG/HHR by Dr. Elmore on 17    Says was doing really well up until just 2 days ago.  Then started w/ nausea and diarrhea.  Taking Imodium has helped w/ the diarrhea, but does not have any nausea meds.  Still managing to get 90g prot/day but says it is getting harder.  Denies abd.pain, vomiting, fevers/chills.  On Omeprazole + Eliquis.  Taking MVI, B12, B1, Calcium, Vit D, iron and Vit C.  Needs Actigall RX.  Ambulating frequently.     Presurgery weight: 234 pounds.  Today's weight is 99.3 kg (219 lb) pounds, today's  Body mass index is 37.59 kg/(m^2)., and her weight loss since surgery is 15 pounds.       Past Medical History:   Diagnosis Date   • Breast cancer     dx 2015, stage 2, s/p lumpectomy, then chemo/XRT, finished 2016 - follows @ , mammograms q 6months, last 2017 OK   • Depression    • Dyspepsia    • Fatigue    • Heartburn     BID Zantac   • HTN (hypertension)    • Low back pain    • Obesity    • Wears eyeglasses      Past Surgical History:   Procedure Laterality Date   • BREAST LUMPECTOMY Left     for stage 2 breast CA   •  SECTION     • COLONOSCOPY      unremarkable   • ENDOSCOPY N/A 2017    Procedure: ESOPHAGOGASTRODUODENOSCOPY;  Surgeon: Alexandra Elmore MD;  [facility]: [facility];  Service:    • EYE SURGERY      radical keratotomy   • GASTRIC SLEEVE LAPAROSCOPIC N/A 2017    Procedure: GASTRIC SLEEVE LAPAROSCOPIC, HIATAL HERNIA REPAIR LAPAROSCOPIC;  Surgeon: Alexandra Elmore MD;  Location:  [facility] OR;  Service:    • TONSILLECTOMY     • TOTAL ABDOMINAL HYSTERECTOMY      endometriosis   • TUBAL ABDOMINAL LIGATION     • TYMPANOSTOMY TUBE PLACEMENT    "    Outpatient Prescriptions Marked as Taking for the 12/19/17 encounter (Office Visit) with CATRINA Bruno   Medication Sig Dispense Refill   • anastrozole (ARIMIDEX) 1 MG tablet Take  by mouth Every Night.     • apixaban (ELIQUIS) 2.5 MG tablet tablet Take 1 tablet by mouth 2 (Two) Times a Day. 42 tablet 0   • Biotin 10 MG capsule Take 10 mg by mouth Daily.     • buPROPion SR (WELLBUTRIN SR) 150 MG 12 hr tablet Take 150 mg by mouth 2 (Two) Times a Day.     • cetirizine (zyrTEC) 10 MG tablet Take 10 mg by mouth Daily.     • Cholecalciferol (VITAMIN D-3) 5000 units tablet Take 1 tablet by mouth Daily.     • Melatonin 10 MG tablet Take 1 tablet by mouth every night at bedtime.     • metoprolol tartrate (LOPRESSOR) 25 MG tablet Take 25 mg by mouth 2 (Two) Times a Day.     • Multiple Vitamins-Minerals (MULTIVITAMIN ADULT PO) Take  by mouth Daily.     • omeprazole (PRILOSEC) 40 MG capsule Take 1 capsule by mouth Daily for 60 days. 60 capsule 1   • vitamin E 400 UNIT capsule Take 400 Units by mouth Daily.       Allergies   Allergen Reactions   • Ace Inhibitors Swelling     angioedema   • Sulfa Antibiotics      rigors/chills       Social History     Social History   • Marital status:      Spouse name: Tavo Davis   • Number of children: N/A   • Years of education: Masters degree     Occupational History   • [employer]     Social History Main Topics   • Smoking status: Never Smoker   • Smokeless tobacco: Never Used   • Alcohol use Yes      Comment: Less than 1 per mo. 1 drink each time for 30 yrs   • Drug use: No   • Sexual activity: Defer     Other Topics Concern   • Not on file     Social History Narrative    Lives in [place], [place] w/ .   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BP 90/62 (BP Location: Left arm, Patient Position: Sitting, Cuff Size: Large Adult)  Pulse 84  Temp 98.1 °F (36.7 °C) (Temporal Artery )   Resp 18  Ht 162.6 cm (64\")  Wt 99.3 kg " (219 lb)  SpO2 99%  BMI 37.59 kg/m2  Physical Exam   Constitutional: She appears well-developed and well-nourished. She is cooperative.   obese   HENT:   Mouth/Throat: Oropharynx is clear and moist and mucous membranes are normal.   Eyes: Conjunctivae are normal. No scleral icterus.   Cardiovascular: Normal rate.    Pulmonary/Chest: Effort normal.   Abdominal: Soft. Bowel sounds are normal. There is no tenderness.   Incisions healing well   Musculoskeletal: Normal range of motion. She exhibits no edema.   Neurological: She is alert.   Skin: Skin is warm and dry. No rash noted.   Psychiatric: She has a normal mood and affect. Judgment normal.         Assessment:   POD # 7 s/p LSG/HHR by Dr. Elmore on 12/12/17      Plan:  Continue to advance diet per manual.  Will RX Zofran/Phenergan for nausea.  Call if sx persist/worsen.  Increase exercise/activity as tolerated.  Reviewed lifting restrictions, nothing >25 lbs x 2 more weeks.  Continue vitamins.  Continue PPI.  Start Actigall as prescribed.  Continue to avoid ASA/NSAIDs/Steroids/tobacco x 6 weeks postop.  Call w/ any other problems/concerns.    The patient was instructed to follow up in 3 weeks, sooner if needed.

## 2018-01-10 ENCOUNTER — OFFICE VISIT (OUTPATIENT)
Dept: BARIATRICS/WEIGHT MGMT | Facility: CLINIC | Age: 58
End: 2018-01-10

## 2018-01-10 VITALS
OXYGEN SATURATION: 99 % | TEMPERATURE: 97.2 F | HEIGHT: 64 IN | BODY MASS INDEX: 36.02 KG/M2 | SYSTOLIC BLOOD PRESSURE: 123 MMHG | DIASTOLIC BLOOD PRESSURE: 81 MMHG | WEIGHT: 211.01 LBS | HEART RATE: 69 BPM | RESPIRATION RATE: 18 BRPM

## 2018-01-10 DIAGNOSIS — I10 ESSENTIAL HYPERTENSION: ICD-10-CM

## 2018-01-10 DIAGNOSIS — E55.9 VITAMIN D DEFICIENCY: ICD-10-CM

## 2018-01-10 DIAGNOSIS — R79.0 ABNORMAL BLOOD LEVEL OF IRON: ICD-10-CM

## 2018-01-10 DIAGNOSIS — R53.83 FATIGUE, UNSPECIFIED TYPE: Primary | ICD-10-CM

## 2018-01-10 DIAGNOSIS — E66.9 OBESITY, CLASS II, BMI 35-39.9: ICD-10-CM

## 2018-01-10 DIAGNOSIS — R10.13 DYSPEPSIA: ICD-10-CM

## 2018-01-10 DIAGNOSIS — Z98.84 S/P BARIATRIC SURGERY: ICD-10-CM

## 2018-01-10 PROCEDURE — 99024 POSTOP FOLLOW-UP VISIT: CPT | Performed by: PHYSICIAN ASSISTANT

## 2018-01-10 NOTE — PROGRESS NOTES
"[facility] Bariatric Surgery  [address]  [facility] [address]  [phone]      Patient Name:  Sonya Peterson.  :  1960      Date of Visit: 1/10/2018      Reason for Visit:  POD # 29    HPI:  Sonya Peterson is a 57 y.o. female s/p LSG/HHR by Dr. Elmore on 17    Feeling good.  Nausea/diarrhea has resolved.  Tolerating diet progression - on stage 5.  Getting 70-90g prot/day.  Drinking 50-60oz fluid/day, but still feels a little dry and wishes she didn't have to \"sip.\"  Taking Omeprazole + Actigall as prescribed.  Taking recommended vitamins but admits that as the day goes on it gets harder to take \"all of those vitamins.\"  Walking 4 days/week but ready to start exercising more.  No other issues/concerns.    Presurgery weight: 234 pounds.  Today's weight is 95.7 kg (211 lb 0.2 oz) pounds, today's  Body mass index is 36.22 kg/(m^2)., and her weight loss since surgery is 23 pounds.       Past Medical History:   Diagnosis Date   • Breast cancer     dx 2015, stage 2, s/p lumpectomy, then chemo/XRT, finished 2016 - follows @ [facility], mammograms q 6months, last 2017 OK   • Depression    • Dyspepsia    • Fatigue    • Heartburn     BID Zantac   • HTN (hypertension)    • Low back pain    • Obesity    • Wears eyeglasses      Past Surgical History:   Procedure Laterality Date   • BREAST LUMPECTOMY Left     for stage 2 breast CA   •  SECTION     • COLONOSCOPY      unremarkable   • ENDOSCOPY N/A 2017    Procedure: ESOPHAGOGASTRODUODENOSCOPY;  Surgeon: Alexandra Elmore MD;  Location:  [facility] OR;  Service:    • EYE SURGERY      radical keratotomy   • GASTRIC SLEEVE LAPAROSCOPIC N/A 2017    Procedure: GASTRIC SLEEVE LAPAROSCOPIC, HIATAL HERNIA REPAIR LAPAROSCOPIC;  Surgeon: Alexandra Elmore MD;  Location:  [facility] OR;  Service:    • TONSILLECTOMY     • TOTAL ABDOMINAL HYSTERECTOMY      endometriosis   • TUBAL ABDOMINAL LIGATION  " 1999   • TYMPANOSTOMY TUBE PLACEMENT  1974     Outpatient Prescriptions Marked as Taking for the 1/10/18 encounter (Office Visit) with CATRINA Bruno   Medication Sig Dispense Refill   • anastrozole (ARIMIDEX) 1 MG tablet Take  by mouth Every Night.     • Biotin 10 MG capsule Take 10 mg by mouth Daily.     • buPROPion SR (WELLBUTRIN SR) 150 MG 12 hr tablet Take 150 mg by mouth 2 (Two) Times a Day.     • cetirizine (zyrTEC) 10 MG tablet Take 10 mg by mouth Daily.     • Cholecalciferol (VITAMIN D-3) 5000 units tablet Take 1 tablet by mouth Daily.     • Melatonin 10 MG tablet Take 1 tablet by mouth every night at bedtime.     • metoprolol tartrate (LOPRESSOR) 25 MG tablet Take 25 mg by mouth 2 (Two) Times a Day.     • Multiple Vitamins-Minerals (MULTIVITAMIN ADULT PO) Take  by mouth Daily.     • omeprazole (PRILOSEC) 40 MG capsule Take 1 capsule by mouth Daily for 60 days. 60 capsule 1   • ursodiol (ACTIGALL) 300 MG capsule Take 1 capsule by mouth 2 (Two) Times a Day for 30 days. 60 capsule 5   • vitamin E 400 UNIT capsule Take 400 Units by mouth Daily.       Allergies   Allergen Reactions   • Ace Inhibitors Swelling     angioedema   • Sulfa Antibiotics      rigors/chills       Social History     Social History   • Marital status:      Spouse name: Tavo Davis   • Number of children: N/A   • Years of education: Masters degree     Occupational History   • [employer]     Social History Main Topics   • Smoking status: Never Smoker   • Smokeless tobacco: Never Used   • Alcohol use Yes      Comment: Less than 1 per mo. 1 drink each time for 30 yrs   • Drug use: No   • Sexual activity: Defer     Other Topics Concern   • Not on file     Social History Narrative    Lives in [place], [place] w/ .   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[employer]       /81 (BP Location: Left arm, Patient Position: Sitting, Cuff Size: Large Adult)  Pulse 69  Temp 97.2 °F (36.2 °C) (Temporal  "Artery )   Resp 18  Ht 162.6 cm (64\")  Wt 95.7 kg (211 lb 0.2 oz)  SpO2 99%  BMI 36.22 kg/m2  Physical Exam   Constitutional: She appears well-developed and well-nourished. She is cooperative.   obese   HENT:   Mouth/Throat: Oropharynx is clear and moist and mucous membranes are normal.   Eyes: Conjunctivae are normal. No scleral icterus.   Cardiovascular: Normal rate.    Pulmonary/Chest: Effort normal.   Abdominal: Soft. Bowel sounds are normal. There is no tenderness.   Incisions healing well   Musculoskeletal: Normal range of motion. She exhibits no edema.   Neurological: She is alert.   Skin: Skin is warm and dry. No rash noted.   Psychiatric: She has a normal mood and affect. Judgment normal.         Assessment:   POD # 29 s/p LSG/HHR by Dr. Elmore on 12/12/17    ICD-10-CM ICD-9-CM   1. Fatigue, unspecified type R53.83 780.79   2. Dyspepsia R10.13 536.8   3. Essential hypertension I10 401.9   4. Abnormal blood level of iron R79.0 790.6   5. Vitamin D deficiency E55.9 268.9   6. Obesity, Class II, BMI 35-39.9 E66.9 278.00   7. S/P bariatric surgery Z98.84 V45.86       Plan:  Continue to advance diet per manual.  Increase calories to 1200/day.  Increase exercise/activity as tolerated.  Routine labs ordered.  Continue current vitamin regimen w/ adjustments pending lab results.  Recommended vitamin patches since she does not particularly care for the comprehensive vitamin regimen that she currently follows.  Continue to avoid ASA/NSAIDs/Steroids/tobacco x 6 weeks postop.  Call w/ any other problems/concerns.    The patient was instructed to follow up in 2 months, sooner if needed.   "

## 2018-01-15 LAB
25(OH)D3+25(OH)D2 SERPL-MCNC: 69.1 NG/ML (ref 30–100)
A-TOCOPHEROL VIT E SERPL-MCNC: 13.6 MG/L (ref 5.3–16.8)
ALBUMIN SERPL-MCNC: 4.4 G/DL (ref 3.5–5.5)
ALBUMIN/GLOB SERPL: 1.6 {RATIO} (ref 1.2–2.2)
ALP SERPL-CCNC: 108 IU/L (ref 39–117)
ALT SERPL-CCNC: 12 IU/L (ref 0–32)
AST SERPL-CCNC: 15 IU/L (ref 0–40)
BASOPHILS # BLD AUTO: 0 X10E3/UL (ref 0–0.2)
BASOPHILS NFR BLD AUTO: 1 %
BILIRUB SERPL-MCNC: 0.4 MG/DL (ref 0–1.2)
BUN SERPL-MCNC: 29 MG/DL (ref 6–24)
BUN/CREAT SERPL: 35 (ref 9–23)
CALCIUM SERPL-MCNC: 9.7 MG/DL (ref 8.7–10.2)
CHLORIDE SERPL-SCNC: 104 MMOL/L (ref 96–106)
CO2 SERPL-SCNC: 21 MMOL/L (ref 18–29)
CREAT SERPL-MCNC: 0.83 MG/DL (ref 0.57–1)
EOSINOPHIL # BLD AUTO: 0.3 X10E3/UL (ref 0–0.4)
EOSINOPHIL NFR BLD AUTO: 4 %
ERYTHROCYTE [DISTWIDTH] IN BLOOD BY AUTOMATED COUNT: 13.1 % (ref 12.3–15.4)
FERRITIN SERPL-MCNC: 150 NG/ML (ref 15–150)
FOLATE SERPL-MCNC: >20 NG/ML
GLOBULIN SER CALC-MCNC: 2.7 G/DL (ref 1.5–4.5)
GLUCOSE SERPL-MCNC: 89 MG/DL (ref 65–99)
HCT VFR BLD AUTO: 42.6 % (ref 34–46.6)
HGB BLD-MCNC: 14.7 G/DL (ref 11.1–15.9)
IMM GRANULOCYTES # BLD: 0 X10E3/UL (ref 0–0.1)
IMM GRANULOCYTES NFR BLD: 0 %
IRON SERPL-MCNC: 75 UG/DL (ref 27–159)
LYMPHOCYTES # BLD AUTO: 2.4 X10E3/UL (ref 0.7–3.1)
LYMPHOCYTES NFR BLD AUTO: 29 %
MAGNESIUM SERPL-MCNC: 1.9 MG/DL (ref 1.6–2.3)
MCH RBC QN AUTO: 31.2 PG (ref 26.6–33)
MCHC RBC AUTO-ENTMCNC: 34.5 G/DL (ref 31.5–35.7)
MCV RBC AUTO: 90 FL (ref 79–97)
METHYLMALONATE SERPL-SCNC: 81 NMOL/L (ref 0–378)
MONOCYTES # BLD AUTO: 0.8 X10E3/UL (ref 0.1–0.9)
MONOCYTES NFR BLD AUTO: 9 %
NEUTROPHILS # BLD AUTO: 4.8 X10E3/UL (ref 1.4–7)
NEUTROPHILS NFR BLD AUTO: 57 %
PHOSPHATE SERPL-MCNC: 3.5 MG/DL (ref 2.5–4.5)
PLATELET # BLD AUTO: 295 X10E3/UL (ref 150–379)
POTASSIUM SERPL-SCNC: 4.4 MMOL/L (ref 3.5–5.2)
PREALB SERPL-MCNC: 25 MG/DL (ref 10–36)
PROT SERPL-MCNC: 7.1 G/DL (ref 6–8.5)
PTH-INTACT SERPL-MCNC: 28 PG/ML (ref 15–65)
RBC # BLD AUTO: 4.71 X10E6/UL (ref 3.77–5.28)
SODIUM SERPL-SCNC: 143 MMOL/L (ref 134–144)
VIT A SERPL-MCNC: 69 UG/DL (ref 20–65)
VIT B1 BLD-SCNC: 281.3 NMOL/L (ref 66.5–200)
WBC # BLD AUTO: 8.5 X10E3/UL (ref 3.4–10.8)
ZINC SERPL-MCNC: 79 UG/DL (ref 56–134)

## 2018-03-12 ENCOUNTER — OFFICE VISIT (OUTPATIENT)
Dept: BARIATRICS/WEIGHT MGMT | Facility: CLINIC | Age: 58
End: 2018-03-12

## 2018-03-12 VITALS
HEIGHT: 64 IN | WEIGHT: 190.5 LBS | RESPIRATION RATE: 18 BRPM | OXYGEN SATURATION: 99 % | SYSTOLIC BLOOD PRESSURE: 123 MMHG | DIASTOLIC BLOOD PRESSURE: 81 MMHG | BODY MASS INDEX: 32.52 KG/M2 | TEMPERATURE: 97.8 F | HEART RATE: 57 BPM

## 2018-03-12 DIAGNOSIS — Z98.84 STATUS POST BARIATRIC SURGERY: ICD-10-CM

## 2018-03-12 DIAGNOSIS — Z13.21 MALNUTRITION SCREEN: ICD-10-CM

## 2018-03-12 DIAGNOSIS — Z13.0 SCREENING, IRON DEFICIENCY ANEMIA: ICD-10-CM

## 2018-03-12 DIAGNOSIS — E55.9 HYPOVITAMINOSIS D: ICD-10-CM

## 2018-03-12 DIAGNOSIS — K91.2 POSTGASTRECTOMY MALABSORPTION: ICD-10-CM

## 2018-03-12 DIAGNOSIS — Z90.3 POSTGASTRECTOMY MALABSORPTION: ICD-10-CM

## 2018-03-12 DIAGNOSIS — R53.83 FATIGUE, UNSPECIFIED TYPE: Primary | ICD-10-CM

## 2018-03-12 DIAGNOSIS — I10 ESSENTIAL HYPERTENSION: ICD-10-CM

## 2018-03-12 PROCEDURE — 99024 POSTOP FOLLOW-UP VISIT: CPT | Performed by: SURGERY

## 2018-03-12 NOTE — PROGRESS NOTES
[facility] Bariatric Surgery  [address]  [facility] [address]  [phone]        Patient Name:  Sonya Peterson.  :  1960      Date of Visit: 3/12/2018      Reason for Visit:   3 months postop     HPI: Sonya Peterson is a 58 y.o. female s/p LSG/HHR by Dr. Elmore on 17     Doing well.  No issues/concerns. Denies dysphagia, reflux, nausea, vomiting and abdominal pain.  Learning what things she can eat well.  Dry chicken is difficult.  Getting 80 g prot/day.  Drinking 64 fluid oz/day.  1 month labs revealed no deficiencies. Taking Patches: D3/calcium, iron, MVI patches, B1/B12 and  .  On Actigall .  Exercise: walking 4-5 days per week.     Presurgery weight: 235 pounds.  Today's weight is 86.4 kg (190 lb 8 oz) pounds, today's  Body mass index is 32.7 kg/m²., and her weight loss since surgery is 45 pounds.      Past Medical History:   Diagnosis Date   • Breast cancer     dx 2015, stage 2, s/p lumpectomy, then chemo/XRT, finished 2016 - follows @ [facility], mammograms q 6months, last 2017 OK   • Depression    • Dyspepsia    • Fatigue    • Heartburn     BID Zantac   • HTN (hypertension)    • Low back pain    • Obesity    • Wears eyeglasses      Past Surgical History:   Procedure Laterality Date   • BREAST LUMPECTOMY Left     for stage 2 breast CA   •  SECTION     • COLONOSCOPY      unremarkable   • ENDOSCOPY N/A 2017    Procedure: ESOPHAGOGASTRODUODENOSCOPY;  Surgeon: Alexandra Elmore MD;  Location:  [facility] OR;  Service:    • EYE SURGERY      radical keratotomy   • GASTRIC SLEEVE LAPAROSCOPIC N/A 2017    Procedure: GASTRIC SLEEVE LAPAROSCOPIC, HIATAL HERNIA REPAIR LAPAROSCOPIC;  Surgeon: Alxeandra Elmore MD;  Location:  [facility] OR;  Service:    • TONSILLECTOMY     • TOTAL ABDOMINAL HYSTERECTOMY      endometriosis   • TUBAL ABDOMINAL LIGATION     • TYMPANOSTOMY TUBE PLACEMENT       Outpatient Prescriptions Marked  "as Taking for the 3/12/18 encounter (Office Visit) with Alexandra Elmore MD   Medication Sig Dispense Refill   • anastrozole (ARIMIDEX) 1 MG tablet Take  by mouth Every Night.     • Biotin 10 MG capsule Take 10 mg by mouth Daily.     • buPROPion SR (WELLBUTRIN SR) 150 MG 12 hr tablet Take 150 mg by mouth 2 (Two) Times a Day.     • cetirizine (zyrTEC) 10 MG tablet Take 10 mg by mouth Daily.     • Cholecalciferol (VITAMIN D-3) 5000 units tablet Take 1 tablet by mouth Daily.     • Melatonin 10 MG tablet Take 1 tablet by mouth every night at bedtime.     • metoprolol tartrate (LOPRESSOR) 25 MG tablet Take 25 mg by mouth 2 (Two) Times a Day.     • Multiple Vitamins-Minerals (MULTIVITAMIN ADULT PO) Take  by mouth Daily.     • vitamin E 400 UNIT capsule Take 400 Units by mouth Daily.         Allergies   Allergen Reactions   • Ace Inhibitors Swelling     angioedema   • Sulfa Antibiotics      rigors/chills       Social History     Social History   • Marital status:      Spouse name: Tavo Davis   • Number of children: N/A   • Years of education: Masters degree     Occupational History   • [employer]     Social History Main Topics   • Smoking status: Never Smoker   • Smokeless tobacco: Never Used   • Alcohol use Yes      Comment: Less than 1 per mo. 1 drink each time for 30 yrs   • Drug use: No   • Sexual activity: Defer     Other Topics Concern   • Not on file     Social History Narrative    Lives in [place], [place] w/ .   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 She appears well-developed and well-nourished. No distress.   HENT:   Head: Normocephalic and " atraumatic.   Mouth/Throat: No oropharyngeal exudate.   Eyes: Conjunctivae and EOM are normal. Pupils are equal, round, and reactive to light.   Pulmonary/Chest: Effort normal. No respiratory distress.   Abdominal: Soft. She exhibits no distension.   Incisions well-healed, umbilical ring noted   Neurological: She is alert and oriented to person, place, and time. No cranial nerve deficit.   Skin: Skin is warm and dry. No rash noted. She is not diaphoretic. No erythema.   Psychiatric: She has a normal mood and affect. Her behavior is normal. Judgment and thought content normal.         Assessment:  3 months s/p LSG/HHR by Dr. Elmore on 12/12/17    ICD-10-CM ICD-9-CM   1. Fatigue, unspecified type R53.83 780.79   2. Postgastrectomy malabsorption K91.2 579.3    Z90.3    3. Screening, iron deficiency anemia Z13.0 V78.0   4. Malnutrition screen Z13.21 V77.2   5. Hypovitaminosis D E55.9 268.9   6. Status post bariatric surgery Z98.84 V45.86   7. Essential hypertension I10 401.9         Plan:  Doing well. Continue w/ good food choices and healthy habits.  Continue protein >70g/day.  Continue routine exercise.  Routine bariatric labs ordered.  Continue vitamins w/ adjustments pending lab results.  Call w/ problems/concerns.     The patient was instructed to follow up in 3 months, sooner if needed.    note: approx 15 of the 25 minute visit was spent counseling on nutrition and necessary dietary/lifestyle modifications.    Alexandra Elmore MD

## 2018-03-15 LAB
25(OH)D3+25(OH)D2 SERPL-MCNC: 51.2 NG/ML (ref 30–100)
ALBUMIN SERPL-MCNC: 4.5 G/DL (ref 3.5–5.5)
ALBUMIN/GLOB SERPL: 1.6 {RATIO} (ref 1.2–2.2)
ALP SERPL-CCNC: 101 IU/L (ref 39–117)
ALT SERPL-CCNC: 10 IU/L (ref 0–32)
AST SERPL-CCNC: 15 IU/L (ref 0–40)
BASOPHILS # BLD AUTO: 0 X10E3/UL (ref 0–0.2)
BASOPHILS NFR BLD AUTO: 1 %
BILIRUB SERPL-MCNC: 0.3 MG/DL (ref 0–1.2)
BUN SERPL-MCNC: 27 MG/DL (ref 6–24)
BUN/CREAT SERPL: 27 (ref 9–23)
CALCIUM SERPL-MCNC: 10.1 MG/DL (ref 8.7–10.2)
CHLORIDE SERPL-SCNC: 102 MMOL/L (ref 96–106)
CO2 SERPL-SCNC: 23 MMOL/L (ref 18–29)
CREAT SERPL-MCNC: 1.01 MG/DL (ref 0.57–1)
EOSINOPHIL # BLD AUTO: 0.2 X10E3/UL (ref 0–0.4)
EOSINOPHIL NFR BLD AUTO: 2 %
ERYTHROCYTE [DISTWIDTH] IN BLOOD BY AUTOMATED COUNT: 13.2 % (ref 12.3–15.4)
FERRITIN SERPL-MCNC: 130 NG/ML (ref 15–150)
FOLATE SERPL-MCNC: 12.6 NG/ML
GFR SERPLBLD CREATININE-BSD FMLA CKD-EPI: 62 ML/MIN/1.73
GFR SERPLBLD CREATININE-BSD FMLA CKD-EPI: 72 ML/MIN/1.73
GLOBULIN SER CALC-MCNC: 2.9 G/DL (ref 1.5–4.5)
GLUCOSE SERPL-MCNC: 104 MG/DL (ref 65–99)
HCT VFR BLD AUTO: 46 % (ref 34–46.6)
HGB BLD-MCNC: 15.8 G/DL (ref 11.1–15.9)
IMM GRANULOCYTES # BLD: 0 X10E3/UL (ref 0–0.1)
IMM GRANULOCYTES NFR BLD: 0 %
IRON SERPL-MCNC: 85 UG/DL (ref 27–159)
LYMPHOCYTES # BLD AUTO: 2.5 X10E3/UL (ref 0.7–3.1)
LYMPHOCYTES NFR BLD AUTO: 32 %
MCH RBC QN AUTO: 31.5 PG (ref 26.6–33)
MCHC RBC AUTO-ENTMCNC: 34.3 G/DL (ref 31.5–35.7)
MCV RBC AUTO: 92 FL (ref 79–97)
METHYLMALONATE SERPL-SCNC: 176 NMOL/L (ref 0–378)
MONOCYTES # BLD AUTO: 0.5 X10E3/UL (ref 0.1–0.9)
MONOCYTES NFR BLD AUTO: 7 %
NEUTROPHILS # BLD AUTO: 4.6 X10E3/UL (ref 1.4–7)
NEUTROPHILS NFR BLD AUTO: 58 %
PLATELET # BLD AUTO: 341 X10E3/UL (ref 150–379)
POTASSIUM SERPL-SCNC: 4.4 MMOL/L (ref 3.5–5.2)
PREALB SERPL-MCNC: 26 MG/DL (ref 10–36)
PROT SERPL-MCNC: 7.4 G/DL (ref 6–8.5)
RBC # BLD AUTO: 5.01 X10E6/UL (ref 3.77–5.28)
SODIUM SERPL-SCNC: 142 MMOL/L (ref 134–144)
VIT B1 BLD-SCNC: 301.1 NMOL/L (ref 66.5–200)
WBC # BLD AUTO: 7.9 X10E3/UL (ref 3.4–10.8)

## 2018-06-20 ENCOUNTER — OFFICE VISIT (OUTPATIENT)
Dept: BARIATRICS/WEIGHT MGMT | Facility: CLINIC | Age: 58
End: 2018-06-20

## 2018-06-20 VITALS
RESPIRATION RATE: 18 BRPM | BODY MASS INDEX: 29.88 KG/M2 | TEMPERATURE: 97.2 F | HEART RATE: 58 BPM | HEIGHT: 64 IN | SYSTOLIC BLOOD PRESSURE: 131 MMHG | WEIGHT: 175.01 LBS | OXYGEN SATURATION: 99 % | DIASTOLIC BLOOD PRESSURE: 85 MMHG

## 2018-06-20 DIAGNOSIS — Z13.21 MALNUTRITION SCREEN: ICD-10-CM

## 2018-06-20 DIAGNOSIS — Z13.0 SCREENING, IRON DEFICIENCY ANEMIA: ICD-10-CM

## 2018-06-20 DIAGNOSIS — R53.83 FATIGUE, UNSPECIFIED TYPE: ICD-10-CM

## 2018-06-20 DIAGNOSIS — E66.9 OBESITY, CLASS I, BMI 30-34.9: Primary | ICD-10-CM

## 2018-06-20 DIAGNOSIS — E55.9 HYPOVITAMINOSIS D: ICD-10-CM

## 2018-06-20 DIAGNOSIS — Z90.3 POSTGASTRECTOMY MALABSORPTION: ICD-10-CM

## 2018-06-20 DIAGNOSIS — K91.2 POSTGASTRECTOMY MALABSORPTION: ICD-10-CM

## 2018-06-20 DIAGNOSIS — Z98.84 STATUS POST BARIATRIC SURGERY: ICD-10-CM

## 2018-06-20 PROCEDURE — 99214 OFFICE O/P EST MOD 30 MIN: CPT | Performed by: PHYSICIAN ASSISTANT

## 2018-06-20 RX ORDER — URSODIOL 250 MG/1
250 TABLET, FILM COATED ORAL 3 TIMES DAILY
COMMUNITY
End: 2018-09-24

## 2018-06-20 NOTE — PROGRESS NOTES
[facility] Bariatric Surgery  [address]  [facility] [address]  [phone]        Patient Name:  Snoya Peterson.  :  1960      Date of Visit: 2018      Reason for Visit:   6 months postop      HPI: Sonya Peterson is a 58 y.o. female s/p LSG/HHR by Dr. Elmore on 17     Doing well.  No issues/concerns. Feels that she has stalled.  Denies dysphagia, reflux, nausea, vomiting and abdominal pain.  Getting 75-120g prot/day.  Drinking 60-80 fluid oz/day, water or protein water. Protein shake for breakfast, midmorning yogurt/ cottage cheese snack. Lunch: meats or salad with protein. Afternoon snack, of protein. Dinner: solid protein.  Had been tracking intake, not as good recently- prob 800-1000 calories.   3 month labs revealed suspected dehydraion with elevated BUN and creatinine, advised increase water and stop B1, otherwise continue vitamins .  Taking MVI, B12, Calcium, Vit D, iron and Vit C.  Does not require antacid, is still taking actigall.  Exercising: Has been walking, would like to start exercising more- has elliptical at home.      Presurgery weight: 235 pounds.  Today's weight is 79.4 kg (175 lb 0.2 oz) pounds, today's  Body mass index is 30.04 kg/m²., and her weight loss since surgery is 60 pounds.      Past Medical History:   Diagnosis Date   • Breast cancer     dx 2015, stage 2, s/p lumpectomy, then chemo/XRT, finished 2016 - follows @ , mammograms q 6months, last 2017 OK   • Depression    • Dyspepsia    • Fatigue    • Heartburn     BID Zantac   • HTN (hypertension)    • Low back pain    • Obesity    • Wears eyeglasses      Past Surgical History:   Procedure Laterality Date   • BREAST LUMPECTOMY Left     for stage 2 breast CA   •  SECTION     • COLONOSCOPY      unremarkable   • ENDOSCOPY N/A 2017    Procedure: ESOPHAGOGASTRODUODENOSCOPY;  Surgeon: Alexandra Elmore MD;  Location: [facility];  Service:    • EYE  SURGERY      radical keratotomy   • GASTRIC SLEEVE LAPAROSCOPIC N/A 12/12/2017    Procedure: GASTRIC SLEEVE LAPAROSCOPIC, HIATAL HERNIA REPAIR LAPAROSCOPIC;  Surgeon: Alexandra Elmore MD;  Location: [facility];  Service:    • TONSILLECTOMY  1974   • TOTAL ABDOMINAL HYSTERECTOMY  2004    endometriosis   • TUBAL ABDOMINAL LIGATION  1999   • TYMPANOSTOMY TUBE PLACEMENT  1974     Outpatient Prescriptions Marked as Taking for the 6/20/18 encounter (Office Visit) with Inessa Ricketts PA-C   Medication Sig Dispense Refill   • anastrozole (ARIMIDEX) 1 MG tablet Take  by mouth Every Night.     • Biotin 10 MG capsule Take 10 mg by mouth Daily.     • buPROPion SR (WELLBUTRIN SR) 150 MG 12 hr tablet Take 150 mg by mouth 2 (Two) Times a Day.     • cetirizine (zyrTEC) 10 MG tablet Take 10 mg by mouth Daily.     • Cholecalciferol (VITAMIN D-3) 5000 units tablet Take 1 tablet by mouth Daily.     • IRON PO Take  by mouth.     • Melatonin 10 MG tablet Take 1 tablet by mouth every night at bedtime.     • metoprolol tartrate (LOPRESSOR) 25 MG tablet Take 25 mg by mouth 2 (Two) Times a Day.     • Multiple Vitamins-Minerals (MULTIVITAMIN ADULT PO) Take  by mouth Daily.     • ursodiol (ACTIGALL) 250 MG tablet Take 250 mg by mouth 3 (Three) Times a Day.     • vitamin E 400 UNIT capsule Take 400 Units by mouth Daily.         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 1 drink each time for 30 yrs   • Drug use: No   • Sexual activity: Defer     Other Topics Concern   • Not on file     Social  "History Narrative    Lives in [place], [place] w/ .   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/85 (BP Location: Left arm, Patient Position: Sitting, Cuff Size: Large Adult)   Pulse 58   Temp 97.2 °F (36.2 °C) (Temporal Artery )   Resp 18   Ht 162.6 cm (64\")   Wt 79.4 kg (175 lb 0.2 oz)   SpO2 99%   BMI 30.04 kg/m²     Physical Exam   Constitutional: She is oriented to person, place, and time. She appears well-developed and well-nourished.   HENT:   Head: Normocephalic and atraumatic.   Cardiovascular: Normal rate and regular rhythm.    Pulmonary/Chest: Effort normal and breath sounds normal. No respiratory distress. She has no wheezes.   Abdominal: Soft. Bowel sounds are normal. She exhibits no distension. There is no tenderness.   Incisions well healed   Neurological: She is alert and oriented to person, place, and time.   Skin: Skin is warm and dry.   Psychiatric: She has a normal mood and affect. Her behavior is normal. Judgment and thought content normal.         Assessment:  6 months s/p LSG/HHR by Dr. Elmore on 12/12/17     ICD-10-CM ICD-9-CM   1. Obesity, Class I, BMI 30-34.9 E66.9 278.00   2. Status post bariatric surgery Z98.84 V45.86   3. Postgastrectomy malabsorption K91.2 579.3    Z90.3    4. Malnutrition screen Z13.21 V77.2   5. Screening, iron deficiency anemia Z13.0 V78.0   6. Hypovitaminosis D E55.9 268.9   7. Fatigue, unspecified type R53.83 780.79         Plan:  Doing well. Continue w/ good food choices and healthy habits.  Continue to focus on high protein, low carb.  Restart tracking intake, advised protein 100g and increase calories 1100.  Encouraged routine exercise.  Routine bariatric labs ordered.  Continue vitamins w/ adjustments pending lab results.  Call w/ problems/concerns.     The patient was instructed to follow up in 3 months, sooner if needed.      Total time spent w/ patient 25 minutes and 15 minutes spent counseling the patient on nutrition and " necessary dietary/lifestyle modifications.

## 2018-06-23 LAB
25(OH)D3+25(OH)D2 SERPL-MCNC: 48.1 NG/ML (ref 30–100)
ALBUMIN SERPL-MCNC: 4.4 G/DL (ref 3.5–5.5)
ALBUMIN/GLOB SERPL: 1.8 {RATIO} (ref 1.2–2.2)
ALP SERPL-CCNC: 123 IU/L (ref 39–117)
ALT SERPL-CCNC: 17 IU/L (ref 0–32)
AST SERPL-CCNC: 21 IU/L (ref 0–40)
BASOPHILS # BLD AUTO: 0 X10E3/UL (ref 0–0.2)
BASOPHILS NFR BLD AUTO: 0 %
BILIRUB SERPL-MCNC: 0.3 MG/DL (ref 0–1.2)
BUN SERPL-MCNC: 25 MG/DL (ref 6–24)
BUN/CREAT SERPL: 26 (ref 9–23)
CALCIUM SERPL-MCNC: 9.9 MG/DL (ref 8.7–10.2)
CHLORIDE SERPL-SCNC: 102 MMOL/L (ref 96–106)
CO2 SERPL-SCNC: 25 MMOL/L (ref 20–29)
CREAT SERPL-MCNC: 0.98 MG/DL (ref 0.57–1)
EOSINOPHIL # BLD AUTO: 0.2 X10E3/UL (ref 0–0.4)
EOSINOPHIL NFR BLD AUTO: 2 %
ERYTHROCYTE [DISTWIDTH] IN BLOOD BY AUTOMATED COUNT: 12.9 % (ref 12.3–15.4)
FERRITIN SERPL-MCNC: 110 NG/ML (ref 15–150)
FOLATE SERPL-MCNC: 10.1 NG/ML
GFR SERPLBLD CREATININE-BSD FMLA CKD-EPI: 65 ML/MIN/1.73
GFR SERPLBLD CREATININE-BSD FMLA CKD-EPI: 75 ML/MIN/1.73
GLOBULIN SER CALC-MCNC: 2.5 G/DL (ref 1.5–4.5)
GLUCOSE SERPL-MCNC: 98 MG/DL (ref 65–99)
HCT VFR BLD AUTO: 45.6 % (ref 34–46.6)
HGB BLD-MCNC: 15.2 G/DL (ref 11.1–15.9)
IMM GRANULOCYTES # BLD: 0 X10E3/UL (ref 0–0.1)
IMM GRANULOCYTES NFR BLD: 0 %
IRON SERPL-MCNC: 132 UG/DL (ref 27–159)
LYMPHOCYTES # BLD AUTO: 2.9 X10E3/UL (ref 0.7–3.1)
LYMPHOCYTES NFR BLD AUTO: 40 %
Lab: NORMAL
MCH RBC QN AUTO: 31.4 PG (ref 26.6–33)
MCHC RBC AUTO-ENTMCNC: 33.3 G/DL (ref 31.5–35.7)
MCV RBC AUTO: 94 FL (ref 79–97)
METHYLMALONATE SERPL-SCNC: 126 NMOL/L (ref 0–378)
MONOCYTES # BLD AUTO: 0.5 X10E3/UL (ref 0.1–0.9)
MONOCYTES NFR BLD AUTO: 7 %
NEUTROPHILS # BLD AUTO: 3.7 X10E3/UL (ref 1.4–7)
NEUTROPHILS NFR BLD AUTO: 51 %
PLATELET # BLD AUTO: 316 X10E3/UL (ref 150–379)
POTASSIUM SERPL-SCNC: 4.7 MMOL/L (ref 3.5–5.2)
PREALB SERPL-MCNC: 28 MG/DL (ref 10–36)
PROT SERPL-MCNC: 6.9 G/DL (ref 6–8.5)
RBC # BLD AUTO: 4.84 X10E6/UL (ref 3.77–5.28)
SODIUM SERPL-SCNC: 141 MMOL/L (ref 134–144)
VIT B1 BLD-SCNC: 120.9 NMOL/L (ref 66.5–200)
WBC # BLD AUTO: 7.3 X10E3/UL (ref 3.4–10.8)

## 2018-09-24 ENCOUNTER — OFFICE VISIT (OUTPATIENT)
Dept: BARIATRICS/WEIGHT MGMT | Facility: CLINIC | Age: 58
End: 2018-09-24

## 2018-09-24 VITALS
DIASTOLIC BLOOD PRESSURE: 78 MMHG | RESPIRATION RATE: 18 BRPM | WEIGHT: 168.5 LBS | HEIGHT: 64 IN | HEART RATE: 55 BPM | OXYGEN SATURATION: 99 % | SYSTOLIC BLOOD PRESSURE: 112 MMHG | TEMPERATURE: 97.8 F | BODY MASS INDEX: 28.77 KG/M2

## 2018-09-24 DIAGNOSIS — E55.9 HYPOVITAMINOSIS D: ICD-10-CM

## 2018-09-24 DIAGNOSIS — Z13.0 SCREENING, IRON DEFICIENCY ANEMIA: ICD-10-CM

## 2018-09-24 DIAGNOSIS — Z13.21 MALNUTRITION SCREEN: ICD-10-CM

## 2018-09-24 DIAGNOSIS — Z98.84 STATUS POST BARIATRIC SURGERY: Primary | ICD-10-CM

## 2018-09-24 DIAGNOSIS — Z90.3 POSTGASTRECTOMY MALABSORPTION: ICD-10-CM

## 2018-09-24 DIAGNOSIS — K91.2 POSTGASTRECTOMY MALABSORPTION: ICD-10-CM

## 2018-09-24 DIAGNOSIS — R53.83 FATIGUE, UNSPECIFIED TYPE: ICD-10-CM

## 2018-09-24 PROCEDURE — 99214 OFFICE O/P EST MOD 30 MIN: CPT | Performed by: PHYSICIAN ASSISTANT

## 2018-09-24 RX ORDER — CYANOCOBALAMIN/FOLIC ACID 1MG-400MCG
TABLET, SUBLINGUAL SUBLINGUAL
COMMUNITY

## 2018-09-24 NOTE — PROGRESS NOTES
[facility] Bariatric Surgery  [address]  [facility] [address]  [phone]        Patient Name:  Sonya Peterson.  :  1960      Date of Visit: 2018      Reason for Visit:   9 months postop      HPI: Sonya Peterson is a 58 y.o. female s/p LSG/HHR by Dr. Elmore on 17      Doing well. Feels weight loss has been slow.  Has some constipation which predates surgery, uses sennakot daily.   No other issues/concerns.  Denies dysphagia, reflux, nausea, vomiting and abdominal pain.  Getting 90+g prot/day. Has reduced carbs since LOV. Eating 2.5 meals a day.  Snacks- high protein bar, fruit, cheese, nuts.   Getting 800-1200 calories.  Drinking 66-70+ fluid oz/day, water + clear protein drinks.  3 month labs revealed bariatric levels wnl .  Taking B12, Biotin and Patches: Vit E.  Not requiring antacid.    Exercising- walking at work, not routine exercise- periodic at best.      Presurgery weight: 235 pounds.  Today's weight is 76.4 kg (168 lb 8 oz) pounds, today's  Body mass index is 28.92 kg/m²., and her weight loss since surgery is 67 pounds.      Past Medical History:   Diagnosis Date   • Breast cancer (CMS/HCC)     dx 2015, stage 2, s/p lumpectomy, then chemo/XRT, finished 2016 - follows @ [facility], mammograms q 6months, last 2017 OK   • Depression    • Dyspepsia    • Fatigue    • Heartburn     BID Zantac   • HTN (hypertension)    • Low back pain    • Obesity    • Wears eyeglasses      Past Surgical History:   Procedure Laterality Date   • BREAST LUMPECTOMY Left     for stage 2 breast CA   •  SECTION     • COLONOSCOPY      unremarkable   • ENDOSCOPY N/A 2017    Procedure: ESOPHAGOGASTRODUODENOSCOPY;  Surgeon: Alexandra Elmore MD;  Location: [facility];  Service:    • EYE SURGERY      radical keratotomy   • GASTRIC SLEEVE LAPAROSCOPIC N/A 2017    Procedure: GASTRIC SLEEVE LAPAROSCOPIC, HIATAL HERNIA REPAIR LAPAROSCOPIC;  Surgeon:  Alexandra Elmore MD;  Location: [facility];  Service:    • TONSILLECTOMY  1974   • TOTAL ABDOMINAL HYSTERECTOMY  2004    endometriosis   • TUBAL ABDOMINAL LIGATION  1999   • TYMPANOSTOMY TUBE PLACEMENT  1974     Outpatient Prescriptions Marked as Taking for the 9/24/18 encounter (Office Visit) with Inessa Ricketts PA-C   Medication Sig Dispense Refill   • anastrozole (ARIMIDEX) 1 MG tablet Take  by mouth Every Night.     • Biotin 10 MG capsule Take 10 mg by mouth Daily.     • buPROPion SR (WELLBUTRIN SR) 150 MG 12 hr tablet Take 150 mg by mouth 2 (Two) Times a Day.     • cetirizine (zyrTEC) 10 MG tablet Take 10 mg by mouth Daily.     • Cholecalciferol (VITAMIN D-3) 5000 units tablet Take 1 tablet by mouth Daily.     • Cobalamine Combinations (B-12) 1000-400 MCG sublingual tablet Place  under the tongue.     • Melatonin 10 MG tablet Take 1 tablet by mouth every night at bedtime.     • metoprolol tartrate (LOPRESSOR) 25 MG tablet Take 25 mg by mouth 2 (Two) Times a Day.     • Multiple Vitamins-Minerals (MULTIVITAMIN ADULT PO) Take  by mouth Daily.     • vitamin E 400 UNIT capsule Take 400 Units by mouth Daily.     • [DISCONTINUED] IRON PO Take  by mouth.         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 1 drink each time for 30 yrs   • Drug use: No   • Sexual activity: Defer     Other Topics Concern   • Not on file     Social History Narrative    Lives in [place], [place] w/ .   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[facility]       /78 (BP Location: Left  "arm, Patient Position: Sitting, Cuff Size: Large Adult)   Pulse 55   Temp 97.8 °F (36.6 °C) (Temporal Artery )   Resp 18   Ht 162.6 cm (64\")   Wt 76.4 kg (168 lb 8 oz)   SpO2 99%   BMI 28.92 kg/m²     Physical Exam   Constitutional: She is oriented to person, place, and time. She appears well-developed and well-nourished.   HENT:   Head: Normocephalic and atraumatic.   Cardiovascular: Normal rate and regular rhythm.    Pulmonary/Chest: Effort normal and breath sounds normal.   Abdominal: Soft. Bowel sounds are normal.   Incisions well healed   Neurological: She is alert and oriented to person, place, and time.   Skin: Skin is warm and dry.   Psychiatric: She has a normal mood and affect. Her behavior is normal. Judgment and thought content normal.         Assessment:  9 months s/p LSG/HHR by Dr. Elmore on 12/12/17      ICD-10-CM ICD-9-CM   1. Status post bariatric surgery Z98.84 V45.86   2. Hypovitaminosis D E55.9 268.9   3. Screening, iron deficiency anemia Z13.0 V78.0   4. Malnutrition screen Z13.21 V77.2   5. Postgastrectomy malabsorption K91.2 579.3    Z90.3    6. Fatigue, unspecified type R53.83 780.79         Plan:  Doing well. Continue w/ good food choices and healthy habits.  Continue to focus on high protein, low carb.  Keep tracking intake. Increase calories to 1100 daily.   Encouraged routine exercise.  Routine bariatric labs ordered.  Continue vitamins w/ adjustments pending lab results.  Call w/ problems/concerns.     The patient was instructed to follow up in 3 months, sooner if needed.      Total time spent w/ patient 25 minutes and 15 minutes spent counseling the patient on nutrition and necessary dietary/lifestyle modifications.    "

## 2018-09-27 LAB
A-TOCOPHEROL VIT E SERPL-MCNC: 16.7 MG/L (ref 7–25.1)
ALBUMIN SERPL-MCNC: 4.2 G/DL (ref 3.5–5.5)
ALBUMIN/GLOB SERPL: 1.8 {RATIO} (ref 1.2–2.2)
ALP SERPL-CCNC: 96 IU/L (ref 39–117)
ALT SERPL-CCNC: 15 IU/L (ref 0–32)
AST SERPL-CCNC: 17 IU/L (ref 0–40)
BILIRUB SERPL-MCNC: 0.2 MG/DL (ref 0–1.2)
BUN SERPL-MCNC: 27 MG/DL (ref 6–24)
BUN/CREAT SERPL: 27 (ref 9–23)
CALCIUM SERPL-MCNC: 9.2 MG/DL (ref 8.7–10.2)
CHLORIDE SERPL-SCNC: 107 MMOL/L (ref 96–106)
CO2 SERPL-SCNC: 21 MMOL/L (ref 20–29)
CREAT SERPL-MCNC: 1.01 MG/DL (ref 0.57–1)
ERYTHROCYTE [DISTWIDTH] IN BLOOD BY AUTOMATED COUNT: 12.7 % (ref 12.3–15.4)
FERRITIN SERPL-MCNC: 86 NG/ML (ref 15–150)
FOLATE SERPL-MCNC: 8 NG/ML
GAMMA TOCOPHEROL SERPL-MCNC: 0.5 MG/L (ref 0.5–5.5)
GLOBULIN SER CALC-MCNC: 2.4 G/DL (ref 1.5–4.5)
GLUCOSE SERPL-MCNC: 96 MG/DL (ref 65–99)
HCT VFR BLD AUTO: 41.6 % (ref 34–46.6)
HGB BLD-MCNC: 14.2 G/DL (ref 11.1–15.9)
Lab: NORMAL
MCH RBC QN AUTO: 31.3 PG (ref 26.6–33)
MCHC RBC AUTO-ENTMCNC: 34.1 G/DL (ref 31.5–35.7)
MCV RBC AUTO: 92 FL (ref 79–97)
METHYLMALONATE SERPL-SCNC: 126 NMOL/L (ref 0–378)
PLATELET # BLD AUTO: 315 X10E3/UL (ref 150–379)
POTASSIUM SERPL-SCNC: 4.7 MMOL/L (ref 3.5–5.2)
PROT SERPL-MCNC: 6.6 G/DL (ref 6–8.5)
RBC # BLD AUTO: 4.54 X10E6/UL (ref 3.77–5.28)
SODIUM SERPL-SCNC: 141 MMOL/L (ref 134–144)
VIT B1 BLD-SCNC: 107.7 NMOL/L (ref 66.5–200)
WBC # BLD AUTO: 7.4 X10E3/UL (ref 3.4–10.8)

## 2019-01-02 ENCOUNTER — OFFICE VISIT (OUTPATIENT)
Dept: BARIATRICS/WEIGHT MGMT | Facility: CLINIC | Age: 59
End: 2019-01-02

## 2019-01-02 VITALS
BODY MASS INDEX: 28 KG/M2 | DIASTOLIC BLOOD PRESSURE: 71 MMHG | HEIGHT: 64 IN | RESPIRATION RATE: 18 BRPM | TEMPERATURE: 97.5 F | SYSTOLIC BLOOD PRESSURE: 109 MMHG | WEIGHT: 164 LBS | HEART RATE: 60 BPM | OXYGEN SATURATION: 99 %

## 2019-01-02 DIAGNOSIS — Z90.3 POSTGASTRECTOMY MALABSORPTION: ICD-10-CM

## 2019-01-02 DIAGNOSIS — Z13.0 SCREENING, IRON DEFICIENCY ANEMIA: ICD-10-CM

## 2019-01-02 DIAGNOSIS — E55.9 HYPOVITAMINOSIS D: ICD-10-CM

## 2019-01-02 DIAGNOSIS — Z98.84 STATUS POST BARIATRIC SURGERY: Primary | ICD-10-CM

## 2019-01-02 DIAGNOSIS — R53.83 FATIGUE, UNSPECIFIED TYPE: ICD-10-CM

## 2019-01-02 DIAGNOSIS — K91.2 POSTGASTRECTOMY MALABSORPTION: ICD-10-CM

## 2019-01-02 DIAGNOSIS — Z13.21 MALNUTRITION SCREEN: ICD-10-CM

## 2019-01-02 PROCEDURE — 99214 OFFICE O/P EST MOD 30 MIN: CPT | Performed by: PHYSICIAN ASSISTANT

## 2019-01-02 PROCEDURE — 94690 O2 UPTK REST INDIRECT: CPT | Performed by: PHYSICIAN ASSISTANT

## 2019-01-02 NOTE — PROGRESS NOTES
[facility] Bariatric Surgery  [address]  [facility] [address] [phone]        Patient Name:  Sonya Peterson.  :  1960      Date of Visit: 2019      Reason for Visit:   Annual Eval  1 year postop    HPI: Sonya Peterson is a 58 y.o. female s/p LSG/HHR by Dr. Elmore on 17        Doing well.  Pleased with progress, but has been stagnant over last 6 months. Would like to lose another 20lb. No GI issues/concerns.  Denies dysphagia, reflux, nausea, vomiting, abdominal pain, pulmonary issues and fevers.  Breakfast: protein shake or bar. Lunch: cheese/ meats/ finger foods/ raw veggies. Dinner: protein/ meat/ fish/ veggies. Shake as needed, once a day.  Occ veggie and hummus, or fruit as a snack. Not tracking calories.   Getting 80g prot/day.  Drinking 60+ fluid oz/day, water or protein clear drinks.  Last labs revealed bariatric levels wnl, Cr 1.01.  Taking MVI, B12, Calcium and E for hot flashes. Not requiring antacid.  Has not been exercising, occasional yoga, active at work 4309-0085 steps per day.       Presurgery weight: 235 pounds.  Today's weight is 74.4 kg (164 lb) pounds, today's  Body mass index is 28.15 kg/m²., and her weight loss since surgery is 71 pounds.      Past Medical History:   Diagnosis Date   • Breast cancer (CMS/HCC)     dx 2015, stage 2, s/p lumpectomy, then chemo/XRT, finished 2016 - follows @ , mammograms q 6months, last 2017 OK   • Depression    • Dyspepsia    • Fatigue    • Heartburn     BID Zantac   • HTN (hypertension)    • Low back pain    • Obesity    • Wears eyeglasses      Past Surgical History:   Procedure Laterality Date   • BREAST LUMPECTOMY Left     for stage 2 breast CA   •  SECTION     • COLONOSCOPY      unremarkable   • ESOPHAGOGASTRODUODENOSCOPY N/A 2017    Performed by Alexandra Elmore MD at [facility] OR   • EYE SURGERY      radical keratotomy   • GASTRIC SLEEVE LAPAROSCOPIC,  HIATAL HERNIA REPAIR LAPAROSCOPIC N/A 12/12/2017    Performed by Alexandra Elmore MD at [facility] OR   • TONSILLECTOMY  1974   • TOTAL ABDOMINAL HYSTERECTOMY  2004    endometriosis   • TUBAL ABDOMINAL LIGATION  1999   • TYMPANOSTOMY TUBE PLACEMENT  1974     Outpatient Medications Marked as Taking for the 1/2/19 encounter (Office Visit) with Inessa Ricketts PA-C   Medication Sig Dispense Refill   • anastrozole (ARIMIDEX) 1 MG tablet Take  by mouth Every Night.     • Biotin 10 MG capsule Take 10 mg by mouth Daily.     • buPROPion SR (WELLBUTRIN SR) 150 MG 12 hr tablet Take 150 mg by mouth 2 (Two) Times a Day.     • cetirizine (zyrTEC) 10 MG tablet Take 10 mg by mouth Daily.     • Cholecalciferol (VITAMIN D-3) 5000 units tablet Take 1 tablet by mouth Daily.     • Cobalamine Combinations (B-12) 1000-400 MCG sublingual tablet Place  under the tongue.     • Melatonin 10 MG tablet Take 1 tablet by mouth every night at bedtime.     • metoprolol tartrate (LOPRESSOR) 25 MG tablet Take 25 mg by mouth 2 (Two) Times a Day.     • Multiple Vitamins-Minerals (MULTIVITAMIN ADULT PO) Take  by mouth Daily.     • vitamin E 400 UNIT capsule Take 400 Units by mouth Daily.         Allergies   Allergen Reactions   • Ace Inhibitors Swelling     angioedema   • Sulfa Antibiotics      rigors/chills       Social History     Socioeconomic History   • Marital status:      Spouse name: Tavo Davis   • Number of children: Not on file   • Years of education: Masters degree   • Highest education level: Not on file   Social Needs   • Financial resource strain: Not on file   • Food insecurity - worry: Not on file   • Food insecurity - inability: Not on file   • Transportation needs - medical: Not on file   • Transportation needs - non-medical: Not on file   Occupational History   • Occupation: APRN     Employer: [employer]   Tobacco Use   • Smoking status: Never Smoker   • Smokeless tobacco: Never Used   Substance and  "Sexual Activity   • Alcohol use: Yes     Comment: Less than 1 per mo. 1 drink each time for 30 yrs   • Drug use: No   • Sexual activity: Defer     Partners: Male   Other Topics Concern   • Not on file   Social History Narrative    Lives in [place], [place] w/ .   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 She appears well-developed and well-nourished.   HENT:   Head: Normocephalic and atraumatic.   Cardiovascular: Normal rate, regular rhythm and normal heart sounds.   Pulmonary/Chest: Effort normal and breath sounds normal. No respiratory distress. She has no wheezes.   Abdominal: Soft. Bowel sounds are normal. She exhibits no distension. There is no tenderness.   Incisions healing well   Neurological: She is alert and oriented to person, place, and time.   Skin: Skin is warm and dry.   Psychiatric: She has a normal mood and affect. Her behavior is normal. Judgment and thought content normal.         Assessment:  1 year s/p LSG/HHR by Dr. Elmore on 12/12/17      ICD-10-CM ICD-9-CM   1. Status post bariatric surgery Z98.84 V45.86   2. Fatigue, unspecified type R53.83 780.79   3. Hypovitaminosis D E55.9 268.9   4. Screening, iron deficiency anemia Z13.0 V78.0   5. Malnutrition screen Z13.21 V77.2   6. Postgastrectomy malabsorption K91.2 579.3    Z90.3          Plan:  Doing well. Will determine WLZ with BMR today. Continue w/ good food choices and healthy habits.  Continue to focus on high protein, low carb.  Keep tracking intake.  Encouraged routine exercise.  Routine bariatric labs ordered.  Continue vitamins w/ adjustments pending lab results.  Call w/ problems/concerns.     The patient was instructed to " follow up in 6 months, sooner if needed.      Total time spent w/ patient 25 minutes and 15 minutes spent counseling the patient on nutrition and necessary dietary/lifestyle modifications.    Addendum: [place] per BMR 1300-0241, metabolism fast 20%

## 2019-01-04 LAB
25(OH)D3+25(OH)D2 SERPL-MCNC: 39.8 NG/ML
ALBUMIN SERPL-MCNC: 4.38 G/DL (ref 3.2–4.8)
ALBUMIN/GLOB SERPL: 2 G/DL (ref 1.5–2.5)
ALP SERPL-CCNC: 103 U/L (ref 25–100)
ALT SERPL-CCNC: 20 U/L (ref 7–40)
AST SERPL-CCNC: 22 U/L (ref 0–33)
BASOPHILS # BLD AUTO: 0.04 10*3/MM3 (ref 0–0.2)
BASOPHILS NFR BLD AUTO: 0.6 % (ref 0–1)
BILIRUB SERPL-MCNC: 0.5 MG/DL (ref 0.3–1.2)
BUN SERPL-MCNC: 26 MG/DL (ref 9–23)
BUN/CREAT SERPL: 29.9 (ref 7–25)
CALCIUM SERPL-MCNC: 9.4 MG/DL (ref 8.7–10.4)
CHLORIDE SERPL-SCNC: 104 MMOL/L (ref 99–109)
CO2 SERPL-SCNC: 29 MMOL/L (ref 20–31)
CREAT SERPL-MCNC: 0.87 MG/DL (ref 0.6–1.3)
EOSINOPHIL # BLD AUTO: 0.19 10*3/MM3 (ref 0–0.3)
EOSINOPHIL NFR BLD AUTO: 2.7 % (ref 0–3)
ERYTHROCYTE [DISTWIDTH] IN BLOOD BY AUTOMATED COUNT: 13 % (ref 11.3–14.5)
FERRITIN SERPL-MCNC: 80 NG/ML (ref 10–291)
FOLATE SERPL-MCNC: >24 NG/ML (ref 3.2–20)
GLOBULIN SER CALC-MCNC: 2.2 GM/DL
GLUCOSE SERPL-MCNC: 88 MG/DL (ref 70–100)
HCT VFR BLD AUTO: 46.3 % (ref 34.5–44)
HGB BLD-MCNC: 15.4 G/DL (ref 11.5–15.5)
IMM GRANULOCYTES # BLD AUTO: 0.01 10*3/MM3 (ref 0–0.03)
IMM GRANULOCYTES NFR BLD AUTO: 0.1 % (ref 0–0.6)
IRON SERPL-MCNC: 157 MCG/DL (ref 50–175)
LYMPHOCYTES # BLD AUTO: 2.73 10*3/MM3 (ref 0.6–4.8)
LYMPHOCYTES NFR BLD AUTO: 38.3 % (ref 24–44)
Lab: NORMAL
MCH RBC QN AUTO: 30.8 PG (ref 27–31)
MCHC RBC AUTO-ENTMCNC: 33.3 G/DL (ref 32–36)
MCV RBC AUTO: 92.6 FL (ref 80–99)
METHYLMALONATE SERPL-SCNC: 149 NMOL/L (ref 0–378)
MONOCYTES # BLD AUTO: 0.68 10*3/MM3 (ref 0–1)
MONOCYTES NFR BLD AUTO: 9.6 % (ref 0–12)
NEUTROPHILS # BLD AUTO: 3.47 10*3/MM3 (ref 1.5–8.3)
NEUTROPHILS NFR BLD AUTO: 48.7 % (ref 41–71)
PLATELET # BLD AUTO: 326 10*3/MM3 (ref 150–450)
POTASSIUM SERPL-SCNC: 5.1 MMOL/L (ref 3.5–5.5)
PREALB SERPL-MCNC: 31 MG/DL (ref 10–36)
PROT SERPL-MCNC: 6.6 G/DL (ref 5.7–8.2)
RBC # BLD AUTO: 5 10*6/MM3 (ref 3.89–5.14)
SODIUM SERPL-SCNC: 139 MMOL/L (ref 132–146)
VIT B1 BLD-SCNC: 146.5 NMOL/L (ref 66.5–200)
WBC # BLD AUTO: 7.12 10*3/MM3 (ref 3.5–10.8)

## 2019-07-01 ENCOUNTER — OFFICE VISIT (OUTPATIENT)
Dept: BARIATRICS/WEIGHT MGMT | Facility: CLINIC | Age: 59
End: 2019-07-01

## 2019-07-01 VITALS
DIASTOLIC BLOOD PRESSURE: 76 MMHG | TEMPERATURE: 96.7 F | SYSTOLIC BLOOD PRESSURE: 116 MMHG | BODY MASS INDEX: 29.71 KG/M2 | WEIGHT: 174 LBS | RESPIRATION RATE: 18 BRPM | HEART RATE: 56 BPM | OXYGEN SATURATION: 99 % | HEIGHT: 64 IN

## 2019-07-01 DIAGNOSIS — E55.9 VITAMIN D DEFICIENCY: ICD-10-CM

## 2019-07-01 DIAGNOSIS — Z98.84 S/P BARIATRIC SURGERY: ICD-10-CM

## 2019-07-01 DIAGNOSIS — I10 ESSENTIAL HYPERTENSION: ICD-10-CM

## 2019-07-01 DIAGNOSIS — R53.83 FATIGUE, UNSPECIFIED TYPE: ICD-10-CM

## 2019-07-01 DIAGNOSIS — R10.13 DYSPEPSIA: Primary | ICD-10-CM

## 2019-07-01 DIAGNOSIS — R79.0 ABNORMAL BLOOD LEVEL OF IRON: ICD-10-CM

## 2019-07-01 PROCEDURE — 99214 OFFICE O/P EST MOD 30 MIN: CPT | Performed by: PHYSICIAN ASSISTANT

## 2019-07-01 NOTE — PROGRESS NOTES
[facility] Bariatric Surgery  [address]  [facility] [address]  [phone]      Patient Name:  Sonya Peterson.  :  1960      Date of Visit: 2019      Reason for Visit:  18 months postop    HPI:  Sonya Peterson is a 59 y.o. female s/p LSG/HHR by Dr. Elmore on 17    BMR advised 1400 alan/day for weight loss.  Since last visit has really stopped eating meat, by choice.  Does find it challenging to get the recommended protein each day, but continues to focus on making high protein choices as able.  Not currently tracking intake.  Not currently exercising other than tracking her steps while at work.  No other issues/concerns.     Last labs 19 looked good from bariatric standpoint.  Taking MVI + Calcium patch + B12 SL.     Presurgery weight: 234 pounds.  Today's weight is 78.9 kg (174 lb) pounds, today's  Body mass index is 29.87 kg/m²., and her weight loss since surgery is 60 pounds.       Past Medical History:   Diagnosis Date   • Breast cancer (CMS/HCC)     dx 2015, stage 2, s/p lumpectomy, then chemo/XRT, finished 2016 - follows @ , mammograms q 6months, last 2017 OK   • Depression    • Dyspepsia    • Fatigue    • Heartburn     BID Zantac   • HTN (hypertension)    • Low back pain    • Obesity    • Wears eyeglasses      Past Surgical History:   Procedure Laterality Date   • BREAST LUMPECTOMY Left     for stage 2 breast CA   •  SECTION     • COLONOSCOPY      unremarkable   • ENDOSCOPY N/A 2017    Procedure: ESOPHAGOGASTRODUODENOSCOPY;  Surgeon: Alexandra Elmore MD;  Location:  [facility];  Service:    • EYE SURGERY      radical keratotomy   • GASTRIC SLEEVE LAPAROSCOPIC N/A 2017    Procedure: GASTRIC SLEEVE LAPAROSCOPIC, HIATAL HERNIA REPAIR LAPAROSCOPIC;  Surgeon: Alexandra Elmore MD;  Location:  [facility] OR;  Service:    • TONSILLECTOMY     • TOTAL ABDOMINAL HYSTERECTOMY      endometriosis   • TUBAL  ABDOMINAL LIGATION  1999   • TYMPANOSTOMY TUBE PLACEMENT  1974     Outpatient Medications Marked as Taking for the 7/1/19 encounter (Office Visit) with Tonja Dietz PA   Medication Sig Dispense Refill   • anastrozole (ARIMIDEX) 1 MG tablet Take  by mouth Every Night.     • Biotin 10 MG capsule Take 10 mg by mouth Daily.     • buPROPion SR (WELLBUTRIN SR) 150 MG 12 hr tablet Take 150 mg by mouth 2 (Two) Times a Day.     • cetirizine (zyrTEC) 10 MG tablet Take 10 mg by mouth Daily.     • Cholecalciferol (VITAMIN D-3) 5000 units tablet Take 1 tablet by mouth Daily.     • Cobalamine Combinations (B-12) 1000-400 MCG sublingual tablet Place  under the tongue.     • Melatonin 10 MG tablet Take 1 tablet by mouth every night at bedtime.     • metoprolol tartrate (LOPRESSOR) 25 MG tablet Take 25 mg by mouth 2 (Two) Times a Day.     • Multiple Vitamins-Minerals (MULTIVITAMIN ADULT PO) Take  by mouth Daily.     • vitamin E 400 UNIT capsule Take 400 Units by mouth Daily.       Allergies   Allergen Reactions   • Ace Inhibitors Swelling     angioedema   • Sulfa Antibiotics      rigors/chills       Social History     Socioeconomic History   • Marital status:      Spouse name: Tavo Davis   • Number of children: Not on file   • Years of education: Masters degree   • Highest education level: Not on file   Occupational History   • Occupation: APRN     Employer: [employer]   Tobacco Use   • Smoking status: Never Smoker   • Smokeless tobacco: Never Used   Substance and Sexual Activity   • Alcohol use: Yes     Comment: Less than 1 per mo. 1 drink each time for 30 yrs   • Drug use: No   • Sexual activity: Defer     Partners: Male   Social History Narrative    Lives in [place], [place] w/ .   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[employer]       /76 (BP Location: Right arm, Patient Position: Sitting, Cuff Size: Adult)   Pulse 56   Temp 96.7 °F (35.9 °C) (Temporal)   Resp 18   Ht 162.6 cm  "(64\")   Wt 78.9 kg (174 lb)   SpO2 99%   BMI 29.87 kg/m²   Physical Exam   Constitutional: She appears well-developed and well-nourished. She is cooperative.   HENT:   Mouth/Throat: Oropharynx is clear and moist and mucous membranes are normal.   Eyes: Conjunctivae are normal. No scleral icterus.   Cardiovascular: Normal rate.   Pulmonary/Chest: Effort normal.   Abdominal: Soft. There is no tenderness.   Musculoskeletal: Normal range of motion. She exhibits no edema.   Neurological: She is alert.   Skin: Skin is warm and dry. No rash noted.   Psychiatric: She has a normal mood and affect. Judgment normal.         Assessment:   18 months s/p LSG/HHR by Dr. Elmore on 12/12/17    ICD-10-CM ICD-9-CM   1. Dyspepsia R10.13 536.8   2. Essential hypertension I10 401.9   3. Abnormal blood level of iron R79.0 790.6   4. Vitamin D deficiency E55.9 268.9   5. Fatigue, unspecified type R53.83 780.79   6. S/P bariatric surgery Z98.84 V45.86       Plan:  Continue w/ good food choices and healthy habits.  Encouraged 100g prot/day.  Start tracking intake if able.  Increase daily/routine exercise/activity.  Routine labs ordered.  Continue current vitamin regimen w/ adjustments pending lab results.  Call w/ issues/concerns.      The patient was instructed to follow up in 6 months, sooner if needed.   "

## 2019-07-05 LAB
25(OH)D3+25(OH)D2 SERPL-MCNC: 45 NG/ML (ref 30–100)
ALBUMIN SERPL-MCNC: 4.4 G/DL (ref 3.5–5.5)
ALBUMIN/GLOB SERPL: 1.6 {RATIO} (ref 1.2–2.2)
ALP SERPL-CCNC: 95 IU/L (ref 39–117)
ALT SERPL-CCNC: 15 IU/L (ref 0–32)
AST SERPL-CCNC: 19 IU/L (ref 0–40)
BASOPHILS # BLD AUTO: 0 X10E3/UL (ref 0–0.2)
BASOPHILS NFR BLD AUTO: 1 %
BILIRUB SERPL-MCNC: <0.2 MG/DL (ref 0–1.2)
BUN SERPL-MCNC: 20 MG/DL (ref 6–24)
BUN/CREAT SERPL: 26 (ref 9–23)
CALCIUM SERPL-MCNC: 9.6 MG/DL (ref 8.7–10.2)
CHLORIDE SERPL-SCNC: 102 MMOL/L (ref 96–106)
CO2 SERPL-SCNC: 25 MMOL/L (ref 20–29)
CREAT SERPL-MCNC: 0.77 MG/DL (ref 0.57–1)
EOSINOPHIL # BLD AUTO: 0.2 X10E3/UL (ref 0–0.4)
EOSINOPHIL NFR BLD AUTO: 3 %
ERYTHROCYTE [DISTWIDTH] IN BLOOD BY AUTOMATED COUNT: 12.4 % (ref 12.3–15.4)
FERRITIN SERPL-MCNC: 61 NG/ML (ref 15–150)
FOLATE SERPL-MCNC: >20 NG/ML
GLOBULIN SER CALC-MCNC: 2.7 G/DL (ref 1.5–4.5)
GLUCOSE SERPL-MCNC: 100 MG/DL (ref 65–99)
HCT VFR BLD AUTO: 42.9 % (ref 34–46.6)
HGB BLD-MCNC: 15 G/DL (ref 11.1–15.9)
IMM GRANULOCYTES # BLD AUTO: 0 X10E3/UL (ref 0–0.1)
IMM GRANULOCYTES NFR BLD AUTO: 0 %
IRON SERPL-MCNC: 85 UG/DL (ref 27–159)
LYMPHOCYTES # BLD AUTO: 3.1 X10E3/UL (ref 0.7–3.1)
LYMPHOCYTES NFR BLD AUTO: 39 %
Lab: NORMAL
MCH RBC QN AUTO: 31.3 PG (ref 26.6–33)
MCHC RBC AUTO-ENTMCNC: 35 G/DL (ref 31.5–35.7)
MCV RBC AUTO: 89 FL (ref 79–97)
METHYLMALONATE SERPL-SCNC: 125 NMOL/L (ref 0–378)
MONOCYTES # BLD AUTO: 0.7 X10E3/UL (ref 0.1–0.9)
MONOCYTES NFR BLD AUTO: 8 %
NEUTROPHILS # BLD AUTO: 3.9 X10E3/UL (ref 1.4–7)
NEUTROPHILS NFR BLD AUTO: 49 %
PLATELET # BLD AUTO: 307 X10E3/UL (ref 150–450)
POTASSIUM SERPL-SCNC: 4.9 MMOL/L (ref 3.5–5.2)
PREALB SERPL-MCNC: 34 MG/DL (ref 10–36)
PROT SERPL-MCNC: 7.1 G/DL (ref 6–8.5)
RBC # BLD AUTO: 4.8 X10E6/UL (ref 3.77–5.28)
SODIUM SERPL-SCNC: 139 MMOL/L (ref 134–144)
VIT B1 BLD-SCNC: 177.4 NMOL/L (ref 66.5–200)
WBC # BLD AUTO: 7.9 X10E3/UL (ref 3.4–10.8)

## (undated) DEVICE — MEDI-VAC NON-CONDUCTIVE SUCTION TUBING: Brand: CARDINAL HEALTH

## (undated) DEVICE — ENDOPATH XCEL BLADELESS TROCARS WITH STABILITY SLEEVES: Brand: ENDOPATH XCEL

## (undated) DEVICE — SYS CLS PORTSITE CT CLOSESURE 5AND10/12

## (undated) DEVICE — ENDOGATOR HYBRID TUBING KIT FOR USE WITH ENDOGATOR IRRIGATION PUMP, OLYMPUS PUMP, GI4000 ESU, AND TORRENT IRRIGATION PUMP.: Brand: ENDOGATOR KIT

## (undated) DEVICE — GLV SURG SENSICARE W/ALOE PF LF 7 STRL

## (undated) DEVICE — ENDOSCOPY KIT: Brand: MEDLINE INDUSTRIES, INC.

## (undated) DEVICE — [HIGH FLOW INSUFFLATOR,  DO NOT USE IF PACKAGE IS DAMAGED,  KEEP DRY,  KEEP AWAY FROM SUNLIGHT,  PROTECT FROM HEAT AND RADIOACTIVE SOURCES.]: Brand: PNEUMOSURE

## (undated) DEVICE — 1 ML TUBERCULIN SYRINGE REGULAR TIP: Brand: MONOJECT

## (undated) DEVICE — TOWEL,OR,DSP,ST,BLUE,STD,8/PK,10PK/CS: Brand: MEDLINE

## (undated) DEVICE — APPL COTN TP PLSTC 6IN STRL LF PK/2

## (undated) DEVICE — MEDI-VAC YANKAUER SUCTION HANDLE W/BULBOUS TIP: Brand: CARDINAL HEALTH

## (undated) DEVICE — NDL FLTR BLNT 18G 1 1/2IN

## (undated) DEVICE — SUT MONOCRYL PLS ANTIB UND 3/0  PS1 27IN

## (undated) DEVICE — FLTR HME STR UNIV W/SMPL PORT

## (undated) DEVICE — TROCAR: Brand: KII FIOS FIRST ENTRY

## (undated) DEVICE — ENDOPATH XCEL UNIVERSAL TROCAR STABLILITY SLEEVES: Brand: ENDOPATH XCEL

## (undated) DEVICE — GLV SURG SENSICARE LT W/ALOE PF LF 6 STRL

## (undated) DEVICE — SENSR O2 OXIMAX FNGR A/ 18IN NONSTR

## (undated) DEVICE — SKIN AFFIX SURG ADHESIVE 72/CS 0.55ML: Brand: MEDLINE

## (undated) DEVICE — GLV SURG DERMASSURE GRN LF PF 7.0

## (undated) DEVICE — TISSUE RETRIEVAL SYSTEM: Brand: INZII RETRIEVAL SYSTEM

## (undated) DEVICE — 50" SINGLE PATIENT USE HOVERMATT: Brand: SINGLE PATIENT USE HOVERMATT

## (undated) DEVICE — HARMONIC HD 1000I SHEARS, 36CM SHAFT LENGTH: Brand: HARMONIC

## (undated) DEVICE — AIRWY SZ11

## (undated) DEVICE — APL DUPLOSPRAYER MIS 40CM

## (undated) DEVICE — DRN PENRS 1/2X18IN LTX

## (undated) DEVICE — CONTN GRAD MEAS TRIANG 32OZ BLK

## (undated) DEVICE — Device: Brand: DEFENDO AIR/WATER/SUCTION AND BIOPSY VALVE

## (undated) DEVICE — PK BARIATRIC 10

## (undated) DEVICE — GLV SURG SENSICARE MICRO PF LF 6.5 STRL

## (undated) DEVICE — ADAPT ST INFUS ADMIN SYR 70IN

## (undated) DEVICE — FLTR PLUMEPORT LAP W/CONN STRL

## (undated) DEVICE — SOL LR 1000ML

## (undated) DEVICE — GOWN,NON-REINFORCED,XL,W/TOWEL,STERILE: Brand: MEDLINE

## (undated) DEVICE — SUT SILK 0 SH 30IN K834H

## (undated) DEVICE — ECHELON FLEX POWERED PLUS LONG ARTICULATING ENDOSCOPIC LINEAR CUTTER, 60MM: Brand: ECHELON FLEX

## (undated) DEVICE — CANNULA,OXY,ADULT,SUPERSOFT,W/7'TUB,UC: Brand: MEDLINE